# Patient Record
Sex: MALE | Race: WHITE | NOT HISPANIC OR LATINO | Employment: UNEMPLOYED | URBAN - METROPOLITAN AREA
[De-identification: names, ages, dates, MRNs, and addresses within clinical notes are randomized per-mention and may not be internally consistent; named-entity substitution may affect disease eponyms.]

---

## 2023-06-06 ENCOUNTER — TELEPHONE (OUTPATIENT)
Dept: INTERNAL MEDICINE CLINIC | Facility: OTHER | Age: 58
End: 2023-06-06

## 2023-08-18 ENCOUNTER — TELEPHONE (OUTPATIENT)
Dept: INTERNAL MEDICINE CLINIC | Facility: OTHER | Age: 58
End: 2023-08-18

## 2023-08-18 NOTE — TELEPHONE ENCOUNTER
Received call from patient requesting to schedule a new patient virtual appointment. Patient does not have any way to get in the office, he is currently bed rested. Can this appointment be made? It does look like in the past pt had scheduled a new pt appointment with Jonny Hyde but it was cancelled.

## 2023-08-22 NOTE — TELEPHONE ENCOUNTER
Wife called back and said that he is home bound that EMS had to bring him home and they live on the second floor. She stated they do not have any home care coming in.  I told her to call her insurance and see if they can get a  to help with arranging transportation to get him to appointments

## 2023-11-02 ENCOUNTER — HOSPITAL ENCOUNTER (INPATIENT)
Facility: HOSPITAL | Age: 58
LOS: 10 days | Discharge: HOSPICE | DRG: 283 | End: 2023-11-12
Attending: EMERGENCY MEDICINE | Admitting: INTERNAL MEDICINE
Payer: MEDICARE

## 2023-11-02 ENCOUNTER — APPOINTMENT (EMERGENCY)
Dept: RADIOLOGY | Facility: HOSPITAL | Age: 58
DRG: 283 | End: 2023-11-02
Payer: MEDICARE

## 2023-11-02 DIAGNOSIS — K59.00 CONSTIPATION, UNSPECIFIED CONSTIPATION TYPE: ICD-10-CM

## 2023-11-02 DIAGNOSIS — R18.8 ASCITES OF LIVER: Primary | ICD-10-CM

## 2023-11-02 DIAGNOSIS — R06.01 ORTHOPNEA: ICD-10-CM

## 2023-11-02 DIAGNOSIS — K72.90 DECOMPENSATED HEPATIC CIRRHOSIS (HCC): ICD-10-CM

## 2023-11-02 DIAGNOSIS — R14.0 ABDOMINAL DISTENTION: ICD-10-CM

## 2023-11-02 DIAGNOSIS — R09.02 HYPOXIA: ICD-10-CM

## 2023-11-02 DIAGNOSIS — K74.60 DECOMPENSATED HEPATIC CIRRHOSIS (HCC): ICD-10-CM

## 2023-11-02 PROBLEM — E66.9 CLASS 2 OBESITY IN ADULT: Status: ACTIVE | Noted: 2023-11-02

## 2023-11-02 PROBLEM — F32.A DEPRESSION: Status: ACTIVE | Noted: 2023-11-02

## 2023-11-02 PROBLEM — E66.812 CLASS 2 OBESITY IN ADULT: Status: ACTIVE | Noted: 2023-11-02

## 2023-11-02 PROBLEM — K72.10 END STAGE LIVER DISEASE (HCC): Status: ACTIVE | Noted: 2023-11-02

## 2023-11-02 LAB
ALBUMIN SERPL BCP-MCNC: 2.3 G/DL (ref 3.5–5)
ALP SERPL-CCNC: 94 U/L (ref 34–104)
ALT SERPL W P-5'-P-CCNC: 5 U/L (ref 7–52)
ANION GAP SERPL CALCULATED.3IONS-SCNC: 5 MMOL/L
APTT PPP: 45 SECONDS (ref 23–37)
AST SERPL W P-5'-P-CCNC: 25 U/L (ref 13–39)
BASOPHILS # BLD AUTO: 0.06 THOUSANDS/ÂΜL (ref 0–0.1)
BASOPHILS NFR BLD AUTO: 1 % (ref 0–1)
BILIRUB DIRECT SERPL-MCNC: 1.02 MG/DL (ref 0–0.2)
BILIRUB SERPL-MCNC: 2.37 MG/DL (ref 0.2–1)
BUN SERPL-MCNC: 9 MG/DL (ref 5–25)
CALCIUM ALBUM COR SERPL-MCNC: 9.6 MG/DL (ref 8.3–10.1)
CALCIUM SERPL-MCNC: 8.2 MG/DL (ref 8.4–10.2)
CHLORIDE SERPL-SCNC: 98 MMOL/L (ref 96–108)
CO2 SERPL-SCNC: 31 MMOL/L (ref 21–32)
CREAT SERPL-MCNC: 0.8 MG/DL (ref 0.6–1.3)
EOSINOPHIL # BLD AUTO: 0.19 THOUSAND/ÂΜL (ref 0–0.61)
EOSINOPHIL NFR BLD AUTO: 3 % (ref 0–6)
ERYTHROCYTE [DISTWIDTH] IN BLOOD BY AUTOMATED COUNT: 15.3 % (ref 11.6–15.1)
GFR SERPL CREATININE-BSD FRML MDRD: 99 ML/MIN/1.73SQ M
GLUCOSE SERPL-MCNC: 91 MG/DL (ref 65–140)
HCT VFR BLD AUTO: 32.2 % (ref 36.5–49.3)
HGB BLD-MCNC: 10.3 G/DL (ref 12–17)
IMM GRANULOCYTES # BLD AUTO: 0.01 THOUSAND/UL (ref 0–0.2)
IMM GRANULOCYTES NFR BLD AUTO: 0 % (ref 0–2)
INR PPP: 1.54 (ref 0.84–1.19)
LIPASE SERPL-CCNC: 12 U/L (ref 11–82)
LYMPHOCYTES # BLD AUTO: 0.9 THOUSANDS/ÂΜL (ref 0.6–4.47)
LYMPHOCYTES NFR BLD AUTO: 13 % (ref 14–44)
MCH RBC QN AUTO: 33.2 PG (ref 26.8–34.3)
MCHC RBC AUTO-ENTMCNC: 32 G/DL (ref 31.4–37.4)
MCV RBC AUTO: 104 FL (ref 82–98)
MONOCYTES # BLD AUTO: 0.98 THOUSAND/ÂΜL (ref 0.17–1.22)
MONOCYTES NFR BLD AUTO: 14 % (ref 4–12)
NEUTROPHILS # BLD AUTO: 4.78 THOUSANDS/ÂΜL (ref 1.85–7.62)
NEUTS SEG NFR BLD AUTO: 69 % (ref 43–75)
NRBC BLD AUTO-RTO: 0 /100 WBCS
PLATELET # BLD AUTO: 233 THOUSANDS/UL (ref 149–390)
PMV BLD AUTO: 8.6 FL (ref 8.9–12.7)
POTASSIUM SERPL-SCNC: 3.7 MMOL/L (ref 3.5–5.3)
PROT SERPL-MCNC: 7.4 G/DL (ref 6.4–8.4)
PROTHROMBIN TIME: 18.2 SECONDS (ref 11.6–14.5)
RBC # BLD AUTO: 3.1 MILLION/UL (ref 3.88–5.62)
SODIUM SERPL-SCNC: 134 MMOL/L (ref 135–147)
WBC # BLD AUTO: 6.92 THOUSAND/UL (ref 4.31–10.16)

## 2023-11-02 PROCEDURE — 85730 THROMBOPLASTIN TIME PARTIAL: CPT | Performed by: EMERGENCY MEDICINE

## 2023-11-02 PROCEDURE — 99222 1ST HOSP IP/OBS MODERATE 55: CPT | Performed by: INTERNAL MEDICINE

## 2023-11-02 PROCEDURE — 85025 COMPLETE CBC W/AUTO DIFF WBC: CPT | Performed by: EMERGENCY MEDICINE

## 2023-11-02 PROCEDURE — 85610 PROTHROMBIN TIME: CPT | Performed by: EMERGENCY MEDICINE

## 2023-11-02 PROCEDURE — 0W9G3ZZ DRAINAGE OF PERITONEAL CAVITY, PERCUTANEOUS APPROACH: ICD-10-PCS | Performed by: RADIOLOGY

## 2023-11-02 PROCEDURE — 82248 BILIRUBIN DIRECT: CPT | Performed by: EMERGENCY MEDICINE

## 2023-11-02 PROCEDURE — 76705 ECHO EXAM OF ABDOMEN: CPT

## 2023-11-02 PROCEDURE — 36415 COLL VENOUS BLD VENIPUNCTURE: CPT | Performed by: EMERGENCY MEDICINE

## 2023-11-02 PROCEDURE — 80053 COMPREHEN METABOLIC PANEL: CPT | Performed by: EMERGENCY MEDICINE

## 2023-11-02 PROCEDURE — 83690 ASSAY OF LIPASE: CPT | Performed by: EMERGENCY MEDICINE

## 2023-11-02 PROCEDURE — 99284 EMERGENCY DEPT VISIT MOD MDM: CPT

## 2023-11-02 RX ORDER — FOLIC ACID 1 MG/1
1 TABLET ORAL DAILY
Status: DISCONTINUED | OUTPATIENT
Start: 2023-11-02 | End: 2023-11-12 | Stop reason: HOSPADM

## 2023-11-02 RX ORDER — BISACODYL 10 MG
10 SUPPOSITORY, RECTAL RECTAL DAILY PRN
Status: DISCONTINUED | OUTPATIENT
Start: 2023-11-02 | End: 2023-11-12 | Stop reason: HOSPADM

## 2023-11-02 RX ORDER — LANOLIN ALCOHOL/MO/W.PET/CERES
100 CREAM (GRAM) TOPICAL DAILY
Status: DISCONTINUED | OUTPATIENT
Start: 2023-11-02 | End: 2023-11-12 | Stop reason: HOSPADM

## 2023-11-02 RX ORDER — LACTULOSE 20 G/30ML
10 SOLUTION ORAL 3 TIMES DAILY
Status: DISCONTINUED | OUTPATIENT
Start: 2023-11-02 | End: 2023-11-03

## 2023-11-02 RX ORDER — POLYETHYLENE GLYCOL 3350 17 G/17G
17 POWDER, FOR SOLUTION ORAL DAILY
Status: DISCONTINUED | OUTPATIENT
Start: 2023-11-02 | End: 2023-11-12 | Stop reason: HOSPADM

## 2023-11-02 RX ORDER — FUROSEMIDE 40 MG/1
40 TABLET ORAL DAILY
Status: DISCONTINUED | OUTPATIENT
Start: 2023-11-02 | End: 2023-11-02

## 2023-11-02 RX ORDER — ENOXAPARIN SODIUM 100 MG/ML
40 INJECTION SUBCUTANEOUS DAILY
Status: DISCONTINUED | OUTPATIENT
Start: 2023-11-02 | End: 2023-11-12 | Stop reason: HOSPADM

## 2023-11-02 RX ADMIN — LACTULOSE 10 G: 20 SOLUTION ORAL at 16:07

## 2023-11-02 RX ADMIN — THIAMINE HCL TAB 100 MG 100 MG: 100 TAB at 16:07

## 2023-11-02 RX ADMIN — FOLIC ACID 1 MG: 1 TABLET ORAL at 16:07

## 2023-11-02 RX ADMIN — LACTULOSE 10 G: 20 SOLUTION ORAL at 22:02

## 2023-11-02 NOTE — DISCHARGE INSTRUCTIONS
Abdominal Paracentesis     WHAT YOU NEED TO KNOW:   Abdominal paracentesis is a procedure to remove abnormal fluid buildup in your abdomen. Fluid builds up because of liver problems, such as swelling and scarring. Heart failure, kidney disease, a mass, or problems with your pancreas may also cause fluid buildup. DISCHARGE INSTRUCTIONS:     Follow up with your healthcare provider as directed: Write down your questions so you remember to ask them during your visits. Wound care: Remove dressing after 24 hours. Leave glue in place. Return to your normal activities    Contact Interventional Radiology at [phone] Oliver PATIENTS: Contact Interventional Radiology at [phone]) Randi Sarah PATIENTS: Contact Interventional Radiology at [phone]) if:  You have a fever and your wound is red and swollen. You have yellow, green, or bad-smelling discharge coming from your wound. You have pain or swelling in your abdomen. You have an upset stomach or you vomit. You have sudden, sharp pain in your abdomen. You urinate very little or not at all. You feel confused and more tired than usual.   Your arm or leg feels warm, tender, and painful. It may look swollen and red. You suddenly feel lightheaded and have trouble breathing.

## 2023-11-02 NOTE — H&P
INTERNAL MEDICINE RESIDENCY ADMISSION H&P     Name: Lida Mensah   Age & Sex: 66 y.o. male   MRN: [id number]  Unit/Bed#: -01   Encounter: [id number]  Primary Care Provider: No primary care provider on file. Code Status: Level 1 - Full Code  Admission Status: INPATIENT   Disposition: Patient requires Med/Surg    Admit to team: SOD Team C     ASSESSMENT/PLAN     Principal Problem:    Decompensated hepatic cirrhosis ([address])  Active Problems:    Constipation    Other ascites      * Decompensated hepatic cirrhosis (HCC)  Assessment & Plan  MELD 3.0: 18 at 11/2/2023 10:02 AM  MELD-Na: 17 at 11/2/2023 10:02 AM  Calculated from:  Serum Creatinine: 0.80 mg/dL (Using min of 1 mg/dL) at 11/2/2023  9:37 AM  Serum Sodium: 134 mmol/L at 11/2/2023  9:37 AM  Total Bilirubin: 2.37 mg/dL at 11/2/2023  9:37 AM  Serum Albumin: 2.3 g/dL at 11/2/2023  9:37 AM  INR(ratio): 1.54 at 11/2/2023 10:02 AM  Age at listing (hypothetical): 66 years  Sex: Male at 11/2/2023 10:02 AM     In the past, patients lab work up was significant for type 1 autoimmune hepatitis with elevated IgG, JOSE E, and anti-smooth muscle antibody. Patient has not had paracentesis since May 2023 and has not followed up with outpatient. Today, patient presented to the ED with severe abdominal distention, constipation, and abdominal pain. Patient states his abdominal swelling has become so severe, he is unable to get around. Additionally, he is unable to lay on his back without significant respiratory distress for CT imaging. On arrival in ED, patient was afebrile. Labs were significant for normal lipase, normal WBC, Hgb 10.3, Na 134, Ca 8.2, ALT 5, Alb 2.3, T. Bili 2.37, INR 1.54, PTT 45, direct bili 1.02. RUQ U/S showed ascites visible in all 4 quadrants.      Plan:  -Plan for large volume paracentesis with IR, NPO  -Will need post-procedure albumin infusion   -Ascites fluid analysis   -Recommend abdominal imaging with IV contrast after paracentesis to eval hepatic vasculature and rule out [address]  -Possible IR-guided liver biopsy after resolution of acute symptoms  -No indication for EGD at this time  -Check BMP after paracentesis  -Pending paracentesis with need re initiation of diuretics   -Thiamine, folic acid started  -Lactulose titrated to 3-4 BM, patient not encephalopathic at this time   -GI consulted, appreciate recommendations     Other ascites  Assessment & Plan  See plan for "decompensated hepatic cirrhosis"    Constipation  Assessment & Plan  Patient constipated in setting of severe ascites and compression most likely    Plan:  -Follow-up after paracentesis for resolution of constipation  -Miralax and Senna started        VTE Pharmacologic Prophylaxis: Enoxaparin  VTE Mechanical Prophylaxis: sequential compression device    CHIEF COMPLAINT     Chief Complaint   Patient presents with    Medical Problem     C/o of distended abd with potential hernia per pt due to feeling a lump in his belly, and constipation. Has not taken laxatives, and states this has been going on for years. HISTORY OF PRESENT ILLNESS     Mr. Deven Rondon is a 66 y.o. male with a past medical history of decompensated cirrhosis 2/2 autoimmune hepatitis who presents to B-ED on 11/2/23 complaining of abdominal pain, distention, and constipation. Patient was previously hospitalized at [facility] in April-May 2023 for elevated bilirubin, hyponatremia, coagulopathy, hypoxia, and hypercapnia. During hospitalization patient underwent multiple paracenteses and thoracenteses. There was multiple incidents where patient was refusing procedures and he was found to not have capacity to make medical decisions per psychiatry. On discharge, patient was recommended to see hepatology, and prescribed multiple medications upon discharge. Patient states he has not taken any of the prescribed medications and still takes no medications on a daily basis.  He did not have any outpatient follow-ups since due to concerns with transportation. In the past, patients lab work up was significant for type 1 autoimmune hepatitis with elevated IgG, JOSE E, and anti-smooth muscle antibody. Patient has not had paracentesis since May 2023 and has not followed up with outpatient. Today, patient presented to the ED with severe abdominal distention, constipation, and abdominal pain. Patient states his abdominal swelling has become so severe, he is unable to get around. Additionally, he is unable to lay on his back without significant respiratory distress for CT imaging. On arrival in ED, patient was afebrile. Labs were significant for normal lipase, normal WBC, Hgb 10.3, Na 134, Ca 8.2, ALT 5, Alb 2.3, T. Bili 2.37, INR 1.54, PTT 45, direct bili 1.02. RUQ U/S showed ascites visible in all 4 quadrants. Patient was seen and examined at bedside. He was laying on his right side due to inability to lay flat. Patient states that he was very frustrated with his previous hospitalization and would like to know why he has so much fluid in his abdomen. His largest concern at this point is constipation 2/2 fluid build up and was his main reason for presentation. He denies chest pain, SOB, leg swelling. Patient informed of plan for IR paracentesis. Admitted to [facility] for further evaluation and management. Full Code. REVIEW OF SYSTEMS     Review of Systems   Constitutional:  Positive for fatigue. Negative for activity change, appetite change and fever. HENT:  Negative for congestion. Respiratory:  Negative for chest tightness and shortness of breath. Cardiovascular:  Negative for chest pain, palpitations and leg swelling. Gastrointestinal:  Positive for abdominal distention and constipation. Negative for abdominal pain, diarrhea, nausea and vomiting. Endocrine: Negative for polyuria. Musculoskeletal:  Negative for arthralgias. Skin:  Negative for color change.    Neurological:  Negative for dizziness, syncope, weakness, light-headedness and headaches. All other systems reviewed and are negative. OBJECTIVE     Vitals:    23 1110 23 1200 23 1545 23 1546   BP:  156/91 97/66 97/66   BP Location:       Pulse:  96 92 98   Resp:  20     Temp:   98.1 °F (36.7 °C) 98.1 °F (36.7 °C)   TempSrc:       SpO2: 97% 100% 93% 98%      Temperature:   Temp (24hrs), Av.9 °F (36.6 °C), Min:97.6 °F (36.4 °C), Max:98.1 °F (36.7 °C)    Temperature: 98.1 °F (36.7 °C)  Intake & Output:  I/O       None          Weights: There is no height or weight on file to calculate BMI. Weight (last 2 days)       None            Physical Exam  Vitals and nursing note reviewed. Constitutional:       General: He is not in acute distress. Appearance: He is not ill-appearing or diaphoretic. HENT:      Head: Normocephalic and atraumatic. Nose: No congestion. Mouth/Throat:      Dentition: Abnormal dentition. Pharynx: Oropharynx is clear. Eyes:      Conjunctiva/sclera: Conjunctivae normal.   Cardiovascular:      Rate and Rhythm: Normal rate and regular rhythm. Pulses: Normal pulses. Heart sounds: Normal heart sounds. No murmur heard. Pulmonary:      Effort: Pulmonary effort is normal. No respiratory distress. Breath sounds: Normal breath sounds. No wheezing. Abdominal:      General: There is distension. Musculoskeletal:         General: No swelling. Normal range of motion. Cervical back: Normal range of motion. Right lower leg: No edema. Left lower leg: No edema. Skin:     General: Skin is warm. Capillary Refill: Capillary refill takes less than 2 seconds. Coloration: Skin is not jaundiced. Neurological:      General: No focal deficit present. Mental Status: He is alert and oriented to person, place, and time. Psychiatric:         Mood and Affect: Mood normal.         Behavior: Behavior normal.       PAST MEDICAL HISTORY   History reviewed.  No pertinent past medical history. PAST SURGICAL HISTORY   History reviewed. No pertinent surgical history. SOCIAL & FAMILY HISTORY     Social History     Substance and Sexual Activity   Alcohol Use Not Currently       Social History     Substance and Sexual Activity   Drug Use Never     Social History     Tobacco Use   Smoking Status Never    Passive exposure: Past   Smokeless Tobacco Never     History reviewed. No pertinent family history. LABORATORY DATA     Labs: I have personally reviewed pertinent reports. Results from last 7 days   Lab Units 11/02/23  0937   WBC Thousand/uL 6.92   HEMOGLOBIN g/dL 10.3*   HEMATOCRIT % 32.2*   PLATELETS Thousands/uL 233   NEUTROS PCT % 69   MONOS PCT % 14*   EOS PCT % 3      Results from last 7 days   Lab Units 11/02/23  0937   POTASSIUM mmol/L 3.7   CHLORIDE mmol/L 98   CO2 mmol/L 31   BUN mg/dL 9   CREATININE mg/dL 0.80   CALCIUM mg/dL 8.2*   ALK PHOS U/L 94   ALT U/L 5*   AST U/L 25              Results from last 7 days   Lab Units 11/02/23  1002   INR  1.54*   PTT seconds 45*             Micro:  No results found for: "BLOODCX", "[place]", "WOUNDCULT", "SPUTUMCULTUR"  IMAGING & DIAGNOSTIC TESTS     Imaging: I have personally reviewed pertinent reports. US abdomen limited    Result Date: 11/2/2023  Impression: Ascites visible in all 4 quadrants. Workstation performed: [id number]     EKG, Pathology, and Other Studies: I have personally reviewed pertinent reports.      ALLERGIES   No Known Allergies  MEDICATIONS PRIOR TO ARRIVAL     Prior to Admission medications    Not on File     MEDICATIONS ADMINISTERED IN LAST 24 HOURS     Medication Administration - last 24 hours from 11/01/2023 1611 to 11/02/2023 1611         Date/Time Order Dose Route Action Action by     11/02/2023 1604 EDT enoxaparin (LOVENOX) subcutaneous injection 40 mg 40 mg Subcutaneous Not Given Amalia Ayala RN     11/02/2023 1448 EDT polyethylene glycol (MIRALAX) packet 17 g 17 g Oral Not Given Amalia Ayala RN 11/02/2023 1519 EDT furosemide (LASIX) tablet 40 mg -- Oral Canceled Entry Bibiana Done, RN     11/02/2023 1607 EDT thiamine tablet 100 mg 100 mg Oral Given Mccarty Done, RN     74/77/7114 0995 EDT folic acid (FOLVITE) tablet 1 mg 1 mg Oral Given Mccarty Done, RN     11/02/2023 1607 EDT lactulose oral solution 10 g 10 g Oral Given Bibiana Done, RN          CURRENT MEDICATIONS     Current Facility-Administered Medications   Medication Dose Route Frequency Provider Last Rate    bisacodyl  10 mg Rectal Daily PRN Leodan Hutchison MD      enoxaparin  40 mg Subcutaneous Daily Leodan Hutchison MD      folic acid  1 mg Oral Daily Leodan Hutchison MD      lactulose  10 g Oral TID Leodan Hutchison MD      polyethylene glycol  17 g Oral Daily Leodan Hutchison MD      thiamine  100 mg Oral Daily Leodan Hutchison MD          bisacodyl, 10 mg, Daily PRN        Admission Time  I spent 1 hour admitting the patient. This involved direct patient contact where I performed a full history and physical, reviewing previous records, and reviewing laboratory and other diagnostic studies. Portions of the record may have been created with voice recognition software. Occasional wrong word or "sound a like" substitutions may have occurred due to the inherent limitations of voice recognition software.   Read the chart carefully and recognize, using context, where substitutions have occurred.    ==  Leodan Hutchison MD  [address] [facility]  Internal Medicine Residency PGY-II

## 2023-11-02 NOTE — ED PROCEDURE NOTE
PROCEDURE  CriticalCare Time    Date/Time: 11/2/2023 1:23 PM    Performed by: Ivan Carrington DO  Authorized by:  Ivan Carrington DO    Critical care provider statement:     Critical care time (minutes):  33    Critical care time was exclusive of:  Separately billable procedures and treating other patients and teaching time    Critical care was necessary to treat or prevent imminent or life-threatening deterioration of the following conditions:  Respiratory failure    Critical care was time spent personally by me on the following activities:  Blood draw for specimens, obtaining history from patient or surrogate, development of treatment plan with patient or surrogate, discussions with primary provider, evaluation of patient's response to treatment, examination of patient, review of old charts, re-evaluation of patient's condition, ordering and review of radiographic studies, ordering and review of laboratory studies and ordering and performing treatments and interventions    I assumed direction of critical care for this patient from another provider in my specialty: velia Carrington DO  11/02/23 1322

## 2023-11-02 NOTE — ASSESSMENT & PLAN NOTE
MELD 3.0: 17 at 11/4/2023  3:57 AM  MELD-Na: 17 at 11/4/2023  3:57 AM  Calculated from:  Serum Creatinine: 0.70 mg/dL (Using min of 1 mg/dL) at 11/4/2023  3:57 AM  Serum Sodium: 134 mmol/L at 11/4/2023  3:57 AM  Total Bilirubin: 2.27 mg/dL at 11/4/2023  3:57 AM  Serum Albumin: 2.7 g/dL at 11/4/2023  3:57 AM  INR(ratio): 1.54 at 11/2/2023 10:02 AM  Age at listing (hypothetical): 66 years  Sex: Male at 11/4/2023  3:57 AM         Patient currently presenting with decompensated cirrhosis with marked ascites and encephalopathy. 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 However, patient's mother also revealed a significant history of alcohol abuse. Likely dual etiology. Patient has not had paracentesis since May 2023 and has not followed up with outpatient. S/p paracentesis on 11/3 with removal of 19 L. Partially repleted with albumin, although patient refused full dose. SAAG<1.1, fluid without bacterial growth as of 11/6. S/p paracentesis on 11/6 with removal of 16.45L. Not repleted with albumin, but BP has been stable. Continue to monitor vitals with concern for infection. Patient continues to present with encephalopathy. Not mentating at baseline. Asterixis on exam. Reduced communication and responsiveness during examination. Patient has refused all meds since 11/5 and has not had a bowel movement in several days. Concern for worsening encephalopathy. May need to utilize lactulose enema if PO continues to be refused by patient. If patient transitions to home hospice consider ASEPT catheter to drain ascites. Palliative care consulted. Plan:  Disposition pending further discussion between family, primary team and palliative care; currently decided on home hospice, discussion regarding code status ongoing. Consider ASEPT catheter vs. repeated paracenteses once disposition finalized.   Attempt to give patient aldactone/lactulose, thiamine/folate supplements.    Consider lactulose enema  Monitor outputs  Sodium and fluid restricted diet, ensure protein supplements  6-8g/liter albumin replacement for paracentesis > 5L  Neuropsych eval obtained, patient lacks capacity at this time

## 2023-11-02 NOTE — ED PROVIDER NOTES
Emergency Department Note- Garth Porter 66 y.o. male MRN: [id number]    Unit/Bed#: ED 19 Encounter: [id number]        History of Present Illness     Patient hospitalized April 11 through May 17, 2023 at outside facility, per discharge summary the patient presented with respiratory distress, no known past medical history. Was diagnosed with end-stage liver disease, recurrent ascites causing respiratory failure, hypoxia, hypercapnia and metabolic encephalopathy. He underwent thoracentesis, repeat thoracentesis and paracenteses, ultimately refused additional paracenteses,. Patient was noted to be depressed, it was felt he would not benefit from placement of a Pleurx catheter because of concerns regarding noncompliance and empyema, recommended the patient have frequent paracenteses as an outpatient. He was discharged home, recommendations for outpatient follow-up. Patient says that he lives with his family, since discharge he has not followed up, did not trust the care he received at the outside facility or the network and has been living at home. He says that he will have episodes where his abdomen swells up, he has a bowel movement, feels better. Yesterday he noticed that he had increasing abdominal distention, felt like his abdomen was swelling up, had a hard time having a bowel movement. He took a 15 mL dose of milk of magnesia. He says right now feels short of breath, worse with laying flat and better with sitting up, feels like his abdomen distention is about the same as was from yesterday. He has no fever, no chills, no nausea or vomiting. He said he called an ambulance today because it was harder for him to get up and ambulate with his abdominal distention.   EMS indicates patient remained hemodynamically stable in route    REVIEW OF SYSTEMS    Positive for the above      Past medical history: Ascites, liver disease, depression  Social History   Social History     Substance and Sexual Activity Alcohol Use Not Currently     Social History     Substance and Sexual Activity   Drug Use Never     Social History     Tobacco Use   Smoking Status Never    Passive exposure: Past   Smokeless Tobacco Never     Family History: History reviewed. No pertinent family history. MEDICATIONS: Denies    ALLERGIES:  No Known Allergies    Vitals:    11/02/23 0935 11/02/23 1000 11/02/23 1100 11/02/23 1200   BP:  144/84 151/91 156/91   TempSrc: Oral      Pulse:  98 102 96   Resp:  20  20   Patient Position - Orthostatic VS:   Lying    Temp: 97.6 °F (36.4 °C)          PHYSICAL EXAM    General:  Patient is well-appearing  Head:  Atraumatic  Eyes:  Conjunctiva pink  ENT:  Mucous membranes are moist  Neck:  Supple  Cardiac:  S1-S2, without murmurs  Lungs:  Clear to auscultation bilaterally  Abdomen: Abdomen is distended and tympanitic, nontender, slight shifting dullness. Rectal examination chaperoned by nurse shows no fecal impaction, nontender  Extremities:  Normal range of motion, diffuse lower extremity pitting pedal edema  Neurologic:  Awake, fluent speech, normal comprehension, AAOx3, strength is 2 out of 5 in his bilateral legs, 5 out of 5 in the bilateral arms, normal sensation throughout the whole body.   Skin:  Pink warm and dry  Psychiatric:  Alert, pleasant, cooperative      Labs Reviewed   CBC AND DIFFERENTIAL - Abnormal       Result Value Ref Range Status    WBC 6.92  4.31 - 10.16 Thousand/uL Final    RBC 3.10 (*) 3.88 - 5.62 Million/uL Final    Hemoglobin 10.3 (*) 12.0 - 17.0 g/dL Final    Hematocrit 32.2 (*) 36.5 - 49.3 % Final     (*) 82 - 98 fL Final    MCH 33.2  26.8 - 34.3 pg Final    MCHC 32.0  31.4 - 37.4 g/dL Final    RDW 15.3 (*) 11.6 - 15.1 % Final    MPV 8.6 (*) 8.9 - 12.7 fL Final    Platelets 832  986 - 390 Thousands/uL Final    nRBC 0  /100 WBCs Final    Neutrophils Relative 69  43 - 75 % Final    Immat GRANS % 0  0 - 2 % Final    Lymphocytes Relative 13 (*) 14 - 44 % Final    Monocytes Relative 14 (*) 4 - 12 % Final    Eosinophils Relative 3  0 - 6 % Final    Basophils Relative 1  0 - 1 % Final    Neutrophils Absolute 4.78  1.85 - 7.62 Thousands/µL Final    Immature Grans Absolute 0.01  0.00 - 0.20 Thousand/uL Final    Lymphocytes Absolute 0.90  0.60 - 4.47 Thousands/µL Final    Monocytes Absolute 0.98  0.17 - 1.22 Thousand/µL Final    Eosinophils Absolute 0.19  0.00 - 0.61 Thousand/µL Final    Basophils Absolute 0.06  0.00 - 0.10 Thousands/µL Final   COMPREHENSIVE METABOLIC PANEL - Abnormal    Sodium 134 (*) 135 - 147 mmol/L Final    Potassium 3.7  3.5 - 5.3 mmol/L Final    Chloride 98  96 - 108 mmol/L Final    CO2 31  21 - 32 mmol/L Final    ANION GAP 5  mmol/L Final    BUN 9  5 - 25 mg/dL Final    Creatinine 0.80  0.60 - 1.30 mg/dL Final    Comment: Standardized to IDMS reference method    Glucose 91  65 - 140 mg/dL Final    Comment: If the patient is fasting, the ADA then defines impaired fasting glucose as > 100 mg/dL and diabetes as > or equal to 123 mg/dL. Calcium 8.2 (*) 8.4 - 10.2 mg/dL Final    Corrected Calcium 9.6  8.3 - 10.1 mg/dL Final    AST 25  13 - 39 U/L Final    ALT 5 (*) 7 - 52 U/L Final    Comment: Specimen collection should occur prior to Sulfasalazine administration due to the potential for falsely depressed results. Alkaline Phosphatase 94  34 - 104 U/L Final    Total Protein 7.4  6.4 - 8.4 g/dL Final    Albumin 2.3 (*) 3.5 - 5.0 g/dL Final    Total Bilirubin 2.37 (*) 0.20 - 1.00 mg/dL Final    Comment: Use of this assay is not recommended for patients undergoing treatment with eltrombopag due to the potential for falsely elevated results. N-acetyl-p-benzoquinone imine (metabolite of Acetaminophen) will generate erroneously low results in samples for patients that have taken an overdose of Acetaminophen.     eGFR 99  ml/min/1.73sq m Final    Narrative:     [place] guidelines for Chronic Kidney Disease (CKD):     Stage 1 with normal or high GFR (GFR > 90 mL/min/1.73 square meters)    Stage 2 Mild CKD (GFR = 60-89 mL/min/1.73 square meters)    Stage 3A Moderate CKD (GFR = 45-59 mL/min/1.73 square meters)    Stage 3B Moderate CKD (GFR = 30-44 mL/min/1.73 square meters)    Stage 4 Severe CKD (GFR = 15-29 mL/min/1.73 square meters)    Stage 5 End Stage CKD (GFR <15 mL/min/1.73 square meters)  Note: GFR calculation is accurate only with a steady state creatinine   APTT - Abnormal    PTT 45 (*) 23 - 37 seconds Final    Comment: Therapeutic Heparin Range =  60-90 seconds   PROTIME-INR - Abnormal    Protime 18.2 (*) 11.6 - 14.5 seconds Final    INR 1.54 (*) 0.84 - 1.19 Final   BILIRUBIN, DIRECT - Abnormal    Bilirubin, Direct 1.02 (*) 0.00 - 0.20 mg/dL Final   LIPASE - Normal    Lipase 12  11 - 82 u/L Final       Medications - No data to display    US abdomen limited   Final Result      Ascites visible in all 4 quadrants. Workstation performed: [id number]             ED Course as of 11/02/23 1321   Thu Nov 02, 2023   1100 Notified by CT and nursing staff that the patient had been transferred to the CT table, he became significant more dyspneic, unable to tolerate laying flat. He had a brief episode of being minimally responsive but was never apneic or pulseless. I reassessed the patient, sat him up, he was feeling better sitting up, noted to be hypoxic, 88% on room air, placed on 2 L, 97% O2 saturation. Patient indicated that he felt that he moved slowly and allowed his body time to adjust that he was able to tolerate a supine position for imaging better. 1124 Per hospital discharge summary, patient is known to have elevated bilirubin   1129 Patient slowly placed into a position where he was flat on his back, head slightly elevated. No significant respiratory distress noted       Assessment/Plan     ED Medical Decision Making:    Patient presents with significant mount of ascites, no fever, no abdominal tenderness, do not believe he has SBP.   Given the large amount of abdominal distention, I suspect he will require a large-volume paracentesis which would be safer to be performed via interventional radiology. While he does have some slight hypoxia, he has no sign of significant instability that would require an immediate paracentesis in the ED. He has no evidence of life-threatening metabolic abnormality. The patient was evaluated for sepsis in the emergency department. After that assessment, at the time of admission, no sepsis, severe sepsis, or septic shock was found. MEDICAL DECISION MAKING CODING      Patient presents with acute new problem with:  Threat to life or bodily function      Chronic conditions affecting care: As per HPI    COLLECTION AND INTERPRETATION OF DATA  Additional history obtained from: EMS  I reviewed prior external notes, including hospital discharge summary as noted above      I ordered each unique test  Tests reviewed personally by me:  ECG: See my ED course  Labs: See above  Imaging: I independently reviewed the abdomen ultrasound and found ascites. Discussion with other providers: Admitting medicine physician    RISK    Treatment:  Patient admitted    Social Determinants of Health:  No primary care physician        Critical care time: I consider this patient to be critically ill given his hypoxia requiring treat with oxygen. 33 minutes critical care time. Please see separate procedure note for details.         Time reflects when diagnosis was documented in both MDM as applicable and the Disposition within this note       Time User Action Codes Description Comment    11/2/2023 11:28 AM Nikki Borjasan Add [R18.8] Ascites of liver     11/2/2023 11:28 AM Nikki Borjasan Add [R06.00] Dyspnea     11/2/2023 11:28 AM Mariea Dowse [R06.00] Dyspnea     11/2/2023 11:28 AM Nikki Borjasan Add [R06.01] Orthopnea     11/2/2023 11:29 AM Nikki Glenna Add [R09.02] Hypoxia     11/2/2023 11:29 AM Sharlyalban Glenna Add [R14.0] Abdominal distention           ED Disposition       ED Disposition   Admit    Condition   Stable    Date/Time   Thu Nov 2, 2023 12:58 PM    Comment   Case was discussed with Dr. Dior Ryan and the patient's admission status was agreed to be Admission Status: inpatient status to the service of Dr. Alexander Figueroa .                Follow-up Information    None         New Prescriptions    No medications on file            Alexandria Funk DO  11/02/23 0514

## 2023-11-02 NOTE — ED NOTES
Pt placed on a NRB after his episode of profound hypoxia(68%). His SPO2 recovered (100%)within minutes and the NRB was removed.       Donna Denny RN  11/02/23 0659

## 2023-11-02 NOTE — ASSESSMENT & PLAN NOTE
Patient endorsing constipation on admission, likely in setting of profound abdominal distention and ascites. He continues to endorse ongoing constipation even following paracentesis. Patient previously on lactulose. Of note, patient now with asterixis on exam this morning. Patient refusing lactulose since 11/5. Consider lactulose enema. Patient had 1 BM 11/8. Plan:  Palliative care consulted, future decision making by family. Planned for home hospice with discharge 11/12/23 at 10 AM.   Lactulose therapy, can consider lactulose retention enema as well; has been refusing.   Titrate to 3-4 bowel movements daily  Miralax/Dulcolax otherwise

## 2023-11-02 NOTE — CONSULTS
Consult Service: Gastroenterology      PATIENT INFORMATION      Nora Wright 66 y.o. male MRN: [id number]  Unit/Bed#: ED 23 Encounter: [id number]  PCP: No primary care provider on file. Date of Admission:  11/2/2023  Date of Consultation: 11/02/23  Requesting Physician: Brianna Vasquez, DO       ASSESSMENTS & PLAN   Nora Wright is a 66 y.o. 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      Decompensated Cirrhosis  MELD 3.0: 18 at 11/2/2023 10:02 AM  MELD-Na: 17 at 11/2/2023 10:02 AM  Calculated from:  Serum Creatinine: 0.80 mg/dL (Using min of 1 mg/dL) at 11/2/2023  9:37 AM  Serum Sodium: 134 mmol/L at 11/2/2023  9:37 AM  Total Bilirubin: 2.37 mg/dL at 11/2/2023  9:37 AM  Serum Albumin: 2.3 g/dL at 11/2/2023  9:37 AM  INR(ratio): 1.54 at 11/2/2023 10:02 AM  Age at listing (hypothetical): 66 years  Sex: Male at 11/2/2023 10:02 AM  Etiology  Based on prior workup with negative phosphatidylethanol, positive JOSE E, elevated IgG, positive anti smooth muscle antibody most likely 2/2 autoimmune hepatitis  However has never had a liver biopsy as far as we are able to tell per record review  Pending resolution of acute sx, would consider IR guided liver biopsy  EV  Has never had EGD per pt  On chart review may have had EGD 4/21/23 by Dr. Anders Lundborg at [facility], however unable to view or retrieve report, discharge summary does not report any EGD being performed   Trend CBC   No indication at this time for inpatient EGD but will discuss with patient and family pending treatment of large volume ascites  HE  None  No indication to check or trend ammonia levels, pt is not encephalopathic and has no asterixis  Ascites  Was requiring frequent paracenteses and thoracenteses at [facility] 4/2023-5/2023  Was recommended for weekly paracenteses as an outpt  Has not had a paracentesis since May 2023 per pt  IR consult for paracentesis Will most likely require post paracentesis albumin infusion  Check BMP after paracentesis   Please send cell count, differential, albumin, total protein, cytology with para studies   Pending para, will likely need reinitiation of diuretics    Constipation  Most likely in setting of severe ascites and resulting compression  Management of ascites as above, if still experiencing constipation will start a bowel regimen     REASON FOR CONSULTATION      Decompensated cirrhosis, abdominal pain, constipation    HISTORY OF PRESENT ILLNESS      Pat Aquino is a 66 y.o. male with PMH of decompensated cirrhosis (db ascites) 2/2 AIH presenting with abdominal pain, distention, constipation, with GI consulted for these sx. Per [facility] records, appears to have had no significant medical history until presentation to [facility] 4/2023 for elevated bilirubin, hyponatremia, coagulopathy, hypoxia, hypercapnia. Underwent multiple thoracenteses and paracenteses, found to not have capacity to make medical decisions by psychiatry team there in s/o refusing these procedures multiple times as well. Discharged w hepatology follow up and recommendation for weekly paracenteses. Did not make outpt follow up appointments due to transportation concerns per some of the telephone documentation. Work up for chronic liver disease in the past indicates Type 1 autoimmune hepatitis is the most likely etiology of pt's sx with elevated IgG, JOSE E, and anti-smooth muscle antibody. Pt states his last paracentesis was in the hospital in May. Has not followed up for outpt procedures, office visits, and does not take any medications. Presenting with severe abdominal distention, constipation, abdominal pain. REVIEW OF SYSTEMS     A thorough 12-point review of systems has been conducted. Pertinent positives and negatives are mentioned in the history of present illness. PAST MEDICAL & SURGICAL HISTORY      History reviewed.  No pertinent past medical history. History reviewed. No pertinent surgical history.       MEDICATIONS & ALLERGIES       Medications:   Prior to Admission medications    Not on File       Allergies: No Known Allergies      SOCIAL HISTORY      Marital Status: Single    Substance Use History:   Social History     Substance and Sexual Activity   Alcohol Use Not Currently     Social History     Tobacco Use   Smoking Status Never    Passive exposure: Past   Smokeless Tobacco Never     Social History     Substance and Sexual Activity   Drug Use Never         FAMILY HISTORY      Non-Contributory      PHYSICAL EXAM     Vitals:   Blood Pressure: 156/91 (11/02/23 1200)  Pulse: 96 (11/02/23 1200)  Temperature: 97.6 °F (36.4 °C) (11/02/23 0935)  Temp Source: Oral (11/02/23 0935)  Respirations: 20 (11/02/23 1200)  SpO2: 100 % (11/02/23 1200)    Physical Exam:   GENERAL: NAD  HEENT:  NC/AT, No Scleral Icterus  CARDIAC:  Regular Rate  PULMONARY:  Non-Labored Breathing, No Respiratory Distress  ABDOMEN:  severely distended, TTP  EXTREMITIES:  No Edema, Cyanosis, or Clubbing  NEUROLOGIC:  alert, oriented, no asterixis  SKIN:  No Rashes or Erythema    ADDITIONAL DATA     Lab Results:       Lab Units 11/02/23  0937   SODIUM mmol/L 134*   POTASSIUM mmol/L 3.7   CHLORIDE mmol/L 98   CO2 mmol/L 31   BUN mg/dL 9   CREATININE mg/dL 0.80   GLUCOSE RANDOM mg/dL 91   CALCIUM mg/dL 8.2*            Lab Units 11/02/23  0937   TOTAL PROTEIN g/dL 7.4   ALBUMIN g/dL 2.3*   TOTAL BILIRUBIN mg/dL 2.37*   BILIRUBIN DIRECT mg/dL 1.02*   AST U/L 25   ALT U/L 5*   ALK PHOS U/L 94           Lab Units 11/02/23  0937   WBC Thousand/uL 6.92   HEMOGLOBIN g/dL 10.3*   HEMATOCRIT % 32.2*   PLATELETS Thousands/uL 233   MCV fL 104*       No results found for: "IRON", "TIBC", "FERRITIN"    Lab Results   Component Value Date    INR 1.54 (H) 11/02/2023    PROTIME 18.2 (H) 11/02/2023         Microbiology Results:        Imaging:  Procedure: US abdomen limited    Result Date: 11/2/2023  Narrative: RIGHT UPPER QUADRANT ULTRASOUND INDICATION:    abd distention, eval for ascities. COMPARISON:  None. TECHNIQUE:   Real-time Limited ultrasound of the abdomen was performed with a curvilinear transducer with both volumetric sweeps and still imaging techniques. FINDINGS: ASCITES:   Present in all 4 quadrants. Impression: Ascites visible in all 4 quadrants. Workstation performed: [id number]     Narrative/Impressions - 3 day look back     EKG, Pathology, and Other Studies Reviewed on Admission:   EKG: Reviewed    Counseling / Coordination of Care Time: 30 total mins spent n consult. Greater than 50% of total time spent on patient counseling and coordination of care. ............................................................................................................................................... [place] M.D.  PGY-5 Gastroenterology Fellow  [facility]  Available on [email or web address]  Recommendations not final until attending attestation

## 2023-11-02 NOTE — ED NOTES
CT called for assist for pt with "unresponsive" period, pt responsive upon staff arrival, lips cyanotic, pt removed from CT department returned to room 19, placed on right side, on monitor, O2 applied briefly, pt requesting to remove O2, sats improving to 100% with O2, pt on monitor, Dr. Ernst Marquez at bedside, pt unable to lie on back due to large abdominal girth     Kamille Mendoza RN  11/02/23 0357

## 2023-11-03 ENCOUNTER — APPOINTMENT (INPATIENT)
Dept: RADIOLOGY | Facility: HOSPITAL | Age: 58
DRG: 283 | End: 2023-11-03
Payer: MEDICARE

## 2023-11-03 PROBLEM — D64.9 ANEMIA: Status: ACTIVE | Noted: 2023-11-03

## 2023-11-03 LAB
ALBUMIN FLD-MCNC: <1.5 G/DL
ALBUMIN SERPL BCP-MCNC: 1.9 G/DL (ref 3.5–5)
ALP SERPL-CCNC: 75 U/L (ref 34–104)
ALT SERPL W P-5'-P-CCNC: <3 U/L (ref 7–52)
ANION GAP SERPL CALCULATED.3IONS-SCNC: 4 MMOL/L
ANION GAP SERPL CALCULATED.3IONS-SCNC: 7 MMOL/L
APPEARANCE FLD: ABNORMAL
AST SERPL W P-5'-P-CCNC: 25 U/L (ref 13–39)
BASOPHILS # BLD AUTO: 0.05 THOUSANDS/ÂΜL (ref 0–0.1)
BASOPHILS NFR BLD AUTO: 1 % (ref 0–1)
BASOPHILS NFR FLD MANUAL: 1 %
BILIRUB SERPL-MCNC: 2.07 MG/DL (ref 0.2–1)
BUN SERPL-MCNC: 10 MG/DL (ref 5–25)
BUN SERPL-MCNC: 9 MG/DL (ref 5–25)
CALCIUM ALBUM COR SERPL-MCNC: 9.6 MG/DL (ref 8.3–10.1)
CALCIUM SERPL-MCNC: 7.9 MG/DL (ref 8.4–10.2)
CALCIUM SERPL-MCNC: 8.1 MG/DL (ref 8.4–10.2)
CHLORIDE SERPL-SCNC: 99 MMOL/L (ref 96–108)
CHLORIDE SERPL-SCNC: 99 MMOL/L (ref 96–108)
CO2 SERPL-SCNC: 28 MMOL/L (ref 21–32)
CO2 SERPL-SCNC: 31 MMOL/L (ref 21–32)
COLOR FLD: ABNORMAL
CREAT SERPL-MCNC: 0.64 MG/DL (ref 0.6–1.3)
CREAT SERPL-MCNC: 0.75 MG/DL (ref 0.6–1.3)
EOSINOPHIL # BLD AUTO: 0.15 THOUSAND/ÂΜL (ref 0–0.61)
EOSINOPHIL NFR BLD AUTO: 3 % (ref 0–6)
ERYTHROCYTE [DISTWIDTH] IN BLOOD BY AUTOMATED COUNT: 15.4 % (ref 11.6–15.1)
GFR SERPL CREATININE-BSD FRML MDRD: 102 ML/MIN/1.73SQ M
GFR SERPL CREATININE-BSD FRML MDRD: 109 ML/MIN/1.73SQ M
GLUCOSE FLD-MCNC: 82 MG/DL
GLUCOSE SERPL-MCNC: 71 MG/DL (ref 65–140)
GLUCOSE SERPL-MCNC: 94 MG/DL (ref 65–140)
HCT VFR BLD AUTO: 27.7 % (ref 36.5–49.3)
HGB BLD-MCNC: 8.9 G/DL (ref 12–17)
IMM GRANULOCYTES # BLD AUTO: 0.01 THOUSAND/UL (ref 0–0.2)
IMM GRANULOCYTES NFR BLD AUTO: 0 % (ref 0–2)
LDH FLD L TO P-CCNC: 41 U/L
LYMPHOCYTES # BLD AUTO: 0.93 THOUSANDS/ÂΜL (ref 0.6–4.47)
LYMPHOCYTES NFR BLD AUTO: 17 % (ref 14–44)
LYMPHOCYTES NFR BLD AUTO: 44 %
MAGNESIUM SERPL-MCNC: 1.7 MG/DL (ref 1.9–2.7)
MCH RBC QN AUTO: 33.6 PG (ref 26.8–34.3)
MCHC RBC AUTO-ENTMCNC: 32.1 G/DL (ref 31.4–37.4)
MCV RBC AUTO: 105 FL (ref 82–98)
MONOCYTES # BLD AUTO: 0.94 THOUSAND/ÂΜL (ref 0.17–1.22)
MONOCYTES NFR BLD AUTO: 17 % (ref 4–12)
MONOCYTES NFR BLD AUTO: 54 %
NEUTROPHILS # BLD AUTO: 3.4 THOUSANDS/ÂΜL (ref 1.85–7.62)
NEUTS SEG NFR BLD AUTO: 1 %
NEUTS SEG NFR BLD AUTO: 62 % (ref 43–75)
NRBC BLD AUTO-RTO: 0 /100 WBCS
PHOSPHATE SERPL-MCNC: 3.4 MG/DL (ref 2.7–4.5)
PLATELET # BLD AUTO: 166 THOUSANDS/UL (ref 149–390)
PMV BLD AUTO: 8.6 FL (ref 8.9–12.7)
POTASSIUM SERPL-SCNC: 3.7 MMOL/L (ref 3.5–5.3)
POTASSIUM SERPL-SCNC: 3.8 MMOL/L (ref 3.5–5.3)
PROT FLD-MCNC: <3 G/DL
PROT SERPL-MCNC: 6.5 G/DL (ref 6.4–8.4)
RBC # BLD AUTO: 2.65 MILLION/UL (ref 3.88–5.62)
SITE: ABNORMAL
SODIUM SERPL-SCNC: 134 MMOL/L (ref 135–147)
SODIUM SERPL-SCNC: 134 MMOL/L (ref 135–147)
TOTAL CELLS COUNTED SPEC: 100
WBC # BLD AUTO: 5.48 THOUSAND/UL (ref 4.31–10.16)
WBC # FLD MANUAL: 23 /UL

## 2023-11-03 PROCEDURE — 80053 COMPREHEN METABOLIC PANEL: CPT

## 2023-11-03 PROCEDURE — 83615 LACTATE (LD) (LDH) ENZYME: CPT

## 2023-11-03 PROCEDURE — 85025 COMPLETE CBC W/AUTO DIFF WBC: CPT

## 2023-11-03 PROCEDURE — 84100 ASSAY OF PHOSPHORUS: CPT

## 2023-11-03 PROCEDURE — 88112 CYTOPATH CELL ENHANCE TECH: CPT | Performed by: PATHOLOGY

## 2023-11-03 PROCEDURE — NC001 PR NO CHARGE: Performed by: INTERNAL MEDICINE

## 2023-11-03 PROCEDURE — 89051 BODY FLUID CELL COUNT: CPT

## 2023-11-03 PROCEDURE — 87070 CULTURE OTHR SPECIMN AEROBIC: CPT

## 2023-11-03 PROCEDURE — 87205 SMEAR GRAM STAIN: CPT

## 2023-11-03 PROCEDURE — 88305 TISSUE EXAM BY PATHOLOGIST: CPT | Performed by: PATHOLOGY

## 2023-11-03 PROCEDURE — 49083 ABD PARACENTESIS W/IMAGING: CPT | Performed by: PHYSICIAN ASSISTANT

## 2023-11-03 PROCEDURE — 80048 BASIC METABOLIC PNL TOTAL CA: CPT

## 2023-11-03 PROCEDURE — 82945 GLUCOSE OTHER FLUID: CPT

## 2023-11-03 PROCEDURE — 83735 ASSAY OF MAGNESIUM: CPT

## 2023-11-03 PROCEDURE — 84157 ASSAY OF PROTEIN OTHER: CPT

## 2023-11-03 PROCEDURE — 82042 OTHER SOURCE ALBUMIN QUAN EA: CPT

## 2023-11-03 PROCEDURE — 99223 1ST HOSP IP/OBS HIGH 75: CPT | Performed by: INTERNAL MEDICINE

## 2023-11-03 PROCEDURE — 49083 ABD PARACENTESIS W/IMAGING: CPT

## 2023-11-03 RX ORDER — MAGNESIUM SULFATE HEPTAHYDRATE 40 MG/ML
2 INJECTION, SOLUTION INTRAVENOUS ONCE
Status: COMPLETED | OUTPATIENT
Start: 2023-11-03 | End: 2023-11-03

## 2023-11-03 RX ORDER — ALBUMIN (HUMAN) 12.5 G/50ML
25 SOLUTION INTRAVENOUS EVERY 6 HOURS
Status: DISCONTINUED | OUTPATIENT
Start: 2023-11-03 | End: 2023-11-03

## 2023-11-03 RX ORDER — LIDOCAINE HYDROCHLORIDE 10 MG/ML
INJECTION, SOLUTION EPIDURAL; INFILTRATION; INTRACAUDAL; PERINEURAL AS NEEDED
Status: COMPLETED | OUTPATIENT
Start: 2023-11-03 | End: 2023-11-03

## 2023-11-03 RX ORDER — KETOROLAC TROMETHAMINE 30 MG/ML
15 INJECTION, SOLUTION INTRAMUSCULAR; INTRAVENOUS ONCE
Status: COMPLETED | OUTPATIENT
Start: 2023-11-03 | End: 2023-11-03

## 2023-11-03 RX ORDER — ALBUMIN (HUMAN) 12.5 G/50ML
50 SOLUTION INTRAVENOUS ONCE
Status: COMPLETED | OUTPATIENT
Start: 2023-11-04 | End: 2023-11-04

## 2023-11-03 RX ORDER — LANOLIN ALCOHOL/MO/W.PET/CERES
3 CREAM (GRAM) TOPICAL
Status: DISCONTINUED | OUTPATIENT
Start: 2023-11-03 | End: 2023-11-12 | Stop reason: HOSPADM

## 2023-11-03 RX ORDER — ALBUMIN (HUMAN) 12.5 G/50ML
100 SOLUTION INTRAVENOUS ONCE
Status: COMPLETED | OUTPATIENT
Start: 2023-11-03 | End: 2023-11-03

## 2023-11-03 RX ADMIN — MAGNESIUM SULFATE HEPTAHYDRATE 2 G: 40 INJECTION, SOLUTION INTRAVENOUS at 11:24

## 2023-11-03 RX ADMIN — FOLIC ACID 1 MG: 1 TABLET ORAL at 09:25

## 2023-11-03 RX ADMIN — ALBUMIN (HUMAN) 100 G: 0.25 INJECTION, SOLUTION INTRAVENOUS at 17:20

## 2023-11-03 RX ADMIN — KETOROLAC TROMETHAMINE 15 MG: 30 INJECTION, SOLUTION INTRAMUSCULAR; INTRAVENOUS at 03:55

## 2023-11-03 RX ADMIN — THIAMINE HCL TAB 100 MG 100 MG: 100 TAB at 09:25

## 2023-11-03 RX ADMIN — MELATONIN 3 MG: at 01:52

## 2023-11-03 RX ADMIN — MELATONIN 3 MG: at 21:52

## 2023-11-03 RX ADMIN — LIDOCAINE HYDROCHLORIDE 10 ML: 10 INJECTION, SOLUTION EPIDURAL; INFILTRATION; INTRACAUDAL; PERINEURAL at 14:09

## 2023-11-03 NOTE — PROGRESS NOTES
Patient returned from IR procedure. Bandage on LLQ is clean dry and intact. Upon return to unit, patient became hypotensive, however not symptomatic. MACARENA ROMAN made aware. Per MD, gave albumin. RN will continue to monitor.

## 2023-11-03 NOTE — PROGRESS NOTES
INTERNAL MEDICINE RESIDENCY PROGRESS NOTE     Name: Mahendra Panda   Age & Sex: 66 y.o. male   MRN: [id number]  Unit/Bed#: -01   Encounter: [id number]  Team: SOD Team C     PATIENT INFORMATION     Name: Mahendra Panda   Age & Sex: 66 y.o. male   MRN: [id number]  Hospital Stay Days: 1    ASSESSMENT/PLAN     Principal Problem:    Decompensated hepatic cirrhosis ([address])  Active Problems:    Other ascites    Constipation    Anemia      * Decompensated hepatic cirrhosis ([address])  Assessment & Plan  MELD 3.0: 18 at 11/3/2023  5:11 AM  MELD-Na: 17 at 11/3/2023  5:11 AM  Calculated from:  Serum Creatinine: 0.64 mg/dL (Using min of 1 mg/dL) at 11/3/2023  5:11 AM  Serum Sodium: 134 mmol/L at 11/3/2023  5:11 AM  Total Bilirubin: 2.07 mg/dL at 11/3/2023  5:11 AM  Serum Albumin: 1.9 g/dL at 11/3/2023  5:11 AM  INR(ratio): 1.54 at 11/2/2023 10:02 AM  Age at listing (hypothetical): 66 years  Sex: Male at 11/3/2023  5:11 AM     In the past, patient's lab work up was suggestive of type 1 autoimmune hepatitis with elevated IgG, JOSE E, and anti-smooth muscle antibody. Patient has not had paracentesis since May 2023 and has not followed up with outpatient. Patient presently admitted with cirrhosis decompensated by marked ascites without evidence of encephalopathy. As of today, patient remains with significant ascites on exam.    Plan:  -Plan for large volume paracentesis with IR, currently NPO  -Will need post-procedure albumin infusion; amount to be determined pending paracentesis volume  -Ascites fluid analysis to be collected    -Will try to coordinate liver biopsy to assist with diagnosis once paracentesis completed  -Thiamine, folic acid started  -GI consulted, appreciate recommendations     Other ascites  Assessment & Plan  See plan for "decompensated hepatic cirrhosis"    Anemia  Assessment & Plan  Patient found to be anemic on presentation - hemoglobin today at 8.9.  Suspect that this is at least partially dilutional given patient's significant volume retention, although other causes not excluded. Previous iron panels notable for low normal iron, markedly low transferrin, elevated ferritin. Plan  Continue to monitor, especially following paracentesis  Will consider repeat iron studies in the future    Constipation  Assessment & Plan  Patient endorsing constipation for the last two days - likely in the setting of significant abdominal distention due to ascites. Overall, would expect symptoms to improve following paracentesis    Plan:  -Follow-up after paracentesis for resolution of constipation; consider further evaluation and intervention as indicated  -Miralax/Dulcolax ordered for time being          Disposition: Remain admitted pending IR paracentesis and medical optimization, including ongoing GI evaluation     SUBJECTIVE     Patient seen and examined. Overnight, patient noted to be complaining of abdominal pain, for which he received Toradol. This morning, patient reports severe abdominal distention, constipation, and inability to lay flat due to SOB and wet cough. Patient reports he has not had a bowel movement in 2 days. Patient denies SOB at rest, blood in stool, blood in urine, hemoptysis, LE extremity pain, change in skin tone/rashes, chills, nausea, or vomiting. Patient reports that he has not improved physically since his last hospitalization at [facility] in April/May, and he does not take any medications at home on a daily basis. He expresses belief that drinking dandelion flower tea helps him get rid of a substantial amount of fluid, and does not understand why his abdominal distention would progress to this degree. Patient denies any past medical history prior to the development of current cirrhosis, denies tobacco use other than occasional cigars many years ago, and denies alcohol use. 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  Patient has never had a colonoscopy or EGD.     When asked, patient reports that he denied Lovenox injection yesterday because his father, who passed away, was on Coumadin, and his sister has attributed the death to the use of blood thinners. OBJECTIVE     Vitals:    23 2313 23 0340 23 0600 23 0721   BP: 98/66   100/64   BP Location:       Pulse: 91   86   Resp:       Temp: 98.5 °F (36.9 °C)   98.2 °F (36.8 °C)   TempSrc:       SpO2: 95%   94%   Weight:  (!) 148 kg (325 lb 13.4 oz) (!) 148 kg (325 lb 13.4 oz)       Temperature:   Temp (24hrs), Av.2 °F (36.8 °C), Min:97.6 °F (36.4 °C), Max:98.5 °F (36.9 °C)    Temperature: 98.2 °F (36.8 °C)  Intake & Output:  I/O       None          Weights: There is no height or weight on file to calculate BMI. Weight (last 2 days)       Date/Time Weight    23 0600 148 (325.84)    23 0340 148 (325.84)          Physical Exam  Vitals and nursing note reviewed. Constitutional:       General: He is not in acute distress. Appearance: He is not toxic-appearing or diaphoretic. HENT:      Head: Normocephalic. Right Ear: External ear normal.      Left Ear: External ear normal.      Nose: Nose normal.      Mouth/Throat:      Mouth: Mucous membranes are dry. Pharynx: Oropharynx is clear. Eyes:      General: No scleral icterus. Extraocular Movements: Extraocular movements intact. Conjunctiva/sclera: Conjunctivae normal.      Pupils: Pupils are equal, round, and reactive to light. Cardiovascular:      Rate and Rhythm: Normal rate and regular rhythm. Heart sounds: Normal heart sounds. No murmur heard. No friction rub. No gallop. Pulmonary:      Breath sounds: No stridor. No wheezing, rhonchi or rales. Abdominal:      General: Abdomen is protuberant. Bowel sounds are increased. There is distension. Palpations: There is shifting dullness. Tenderness: There is no abdominal tenderness.    Musculoskeletal:         General: No tenderness. Cervical back: Normal range of motion. Right lower leg: 3+ Edema (Up tp knee joint.) present. Left lower leg: 3+ Edema (Up to knee joint.) present. Skin:     General: Skin is warm. Capillary Refill: Capillary refill takes less than 2 seconds. Coloration: Skin is not cyanotic, jaundiced or pale. Findings: No bruising, erythema, lesion, petechiae or rash. Comments: No telangiectasias, no palmar erythema, no icterus. Neurological:      General: No focal deficit present. Mental Status: He is alert and oriented to person, place, and time. Psychiatric:         Mood and Affect: Mood normal.         Behavior: Behavior normal.       LABORATORY DATA     Labs: I have personally reviewed pertinent reports. Results from last 7 days   Lab Units 11/03/23  0511 11/02/23  0937   WBC Thousand/uL 5.48 6.92   HEMOGLOBIN g/dL 8.9* 10.3*   HEMATOCRIT % 27.7* 32.2*   PLATELETS Thousands/uL 166 233   NEUTROS PCT % 62 69   MONOS PCT % 17* 14*   EOS PCT % 3 3      Results from last 7 days   Lab Units 11/03/23  0511 11/02/23  0937   POTASSIUM mmol/L 3.8 3.7   CHLORIDE mmol/L 99 98   CO2 mmol/L 28 31   BUN mg/dL 9 9   CREATININE mg/dL 0.64 0.80   CALCIUM mg/dL 7.9* 8.2*   ALK PHOS U/L 75 94   ALT U/L <3* 5*   AST U/L 25 25     Results from last 7 days   Lab Units 11/03/23  0511   MAGNESIUM mg/dL 1.7*     Results from last 7 days   Lab Units 11/03/23  0511   PHOSPHORUS mg/dL 3.4      Results from last 7 days   Lab Units 11/02/23  1002   INR  1.54*   PTT seconds 45*               IMAGING & DIAGNOSTIC TESTING     Radiology Results: I have personally reviewed pertinent reports. US abdomen limited    Result Date: 11/2/2023  Impression: Ascites visible in all 4 quadrants. Workstation performed: [id number]     Other Diagnostic Testing: I have personally reviewed pertinent reports.     ACTIVE MEDICATIONS     Current Facility-Administered Medications   Medication Dose Route Frequency bisacodyl (DULCOLAX) rectal suppository 10 mg  10 mg Rectal Daily PRN    enoxaparin (LOVENOX) subcutaneous injection 40 mg  40 mg Subcutaneous Daily    folic acid (FOLVITE) tablet 1 mg  1 mg Oral Daily    magnesium sulfate 2 g/50 mL IVPB (premix) 2 g  2 g Intravenous Once    melatonin tablet 3 mg  3 mg Oral HS    polyethylene glycol (MIRALAX) packet 17 g  17 g Oral Daily    thiamine tablet 100 mg  100 mg Oral Daily       VTE Pharmacologic Prophylaxis: Enoxaparin (Lovenox)  VTE Mechanical Prophylaxis: sequential compression device    Portions of the record may have been created with voice recognition software. Occasional wrong word or "sound a like" substitutions may have occurred due to the inherent limitations of voice recognition software. Read the chart carefully and recognize, using context, where substitutions have occurred.   ==  Greg Lopez MD  [id number]  Internal Medicine Residency PGY-1

## 2023-11-03 NOTE — BRIEF OP NOTE (RAD/CATH)
IR PARACENTESIS Procedure Note    PATIENT NAME: Manuel Ann  : 1965  MRN: [id number]    Pre-op Diagnosis:   1. Ascites of liver    2. Orthopnea    3. Hypoxia    4. Abdominal distention      Post-op Diagnosis:   1. Ascites of liver    2. Orthopnea    3. Hypoxia    4. Abdominal distention        Provider:   Sherren Mina, PA-C    Estimated Blood Loss: none    Findings: LLQ paracentesis, 19,250cc cloudy -> clear yellow over time    Specimens: collected and sent    Complications:  procedure terminated when patient stated he felt light headed. VSS.  More ascites remains    Anesthesia: local    Sherren Mina, PA-C     Date: 11/3/2023  Time: 3:37 PM

## 2023-11-03 NOTE — OCCUPATIONAL THERAPY NOTE
Occupational Therapy Cancel Note        Patient Name: Laisha Romero  [id number] Date: 11/3/2023     11/03/23 1132   OT Last Visit   OT Visit Date 11/03/23   Note Type   Note type Evaluation; Cancelled Session   Cancel Reasons Medical status   OT consulted, chart reviewed. Pt with significant ascites and abdominal distention, pt is undergoing parenthesis this afternoon. Will hold and attempt evaluation s/p procedure.  Bandar Gallo MOT, OTR/L

## 2023-11-03 NOTE — PHYSICAL THERAPY NOTE
PT cancel note       11/03/23 1154   PT Last Visit   PT Visit Date 11/03/23   Note Type   Note type Cancelled Session   Cancel Reasons Medical status     Desirae Ricks

## 2023-11-03 NOTE — UTILIZATION REVIEW
NOTIFICATION OF INPATIENT ADMISSION   AUTHORIZATION REQUEST   SERVICING FACILITY:   [address] [place] [address]  Address: [address] [place] [address] [address] [address]  Tax ID: [id number]  NPI: [id number] ATTENDING PROVIDER:  Attending Name and NPI#: Luann Kolb [[id number]]  Address: [address] [place] [address] [address] [address]  Phone: [phone]   ADMISSION INFORMATION:  Place of Service: Inpatient [address] [facility]  Place of Service Code: 21  Inpatient Admission Date/Time: 11/2/23 12:58 PM  Discharge Date/Time: No discharge date for patient encounter. Admitting Diagnosis Code/Description:  Orthopnea [R06.01]  Abdominal distention [R14.0]  Hypoxia [R09.02]  General weakness [R53.1]  Ascites of liver [R18.8]     UTILIZATION REVIEW CONTACT:  Gissel Freeman Utilization   Network Utilization Review Department  Phone: [phone]  Fax: [phone]  Email: [email or web address]  Contact for approvals/pending authorizations, clinical reviews, and discharge. PHYSICIAN ADVISORY SERVICES:  Medical Necessity Denial & Ypro-pr-Xnxi Review  Phone: [phone]  Fax: [phone]  Email: [email or web address]     DISCHARGE SUPPORT TEAM:  For Patients Discharge Needs & Updates  Phone: [phone] opt. 2 Fax: [phone]  Email: [email or web address]

## 2023-11-03 NOTE — SEDATION DOCUMENTATION
Left side paracentesis performed by Marquis Adam RAYA. 22067 mL cloudy yellow fluid obtained. Patient tolerated well.

## 2023-11-03 NOTE — ASSESSMENT & PLAN NOTE
Patient found to be anemic on presentation - hemoglobin improved to 9.5 following paracentesis. Previous iron panels notable for low normal iron, markedly low transferrin, elevated ferritin. No overt signs of bleeding at this time.     Plan  Continue to monitor, especially following further paracenteses

## 2023-11-03 NOTE — QUICK NOTE
Patient underwent paracentesis with removal of approx. 19 L of ascites; procedure terminated due to patient lightheadedness (per IR note, some ascites remains). After discussion with GI, will give 25% albumin 100 g now followed by another 50 g in 8 hours; follow-up BMP ordered for 2200, with CMP to be done tomorrow morning. Noted by nursing staff that patient was hypotensive post-paracentesis, likely in the setting of marked fluid removal. Will replete with albumin as above and continue to monitor.

## 2023-11-03 NOTE — PLAN OF CARE
Problem: Prexisting or High Potential for Compromised Skin Integrity  Goal: Skin integrity is maintained or improved  Description: INTERVENTIONS:  - Identify patients at risk for skin breakdown  - Assess and monitor skin integrity  - Assess and monitor nutrition and hydration status  - Monitor labs   - Assess for incontinence   - Turn and reposition patient  - Assist with mobility/ambulation  - Relieve pressure over bony prominences  - Avoid friction and shearing  - Provide appropriate hygiene as needed including keeping skin clean and dry  - Evaluate need for skin moisturizer/barrier cream  - Collaborate with interdisciplinary team   - Patient/family teaching  - Consider wound care consult   Outcome: Progressing     Problem: MOBILITY - ADULT  Goal: Maintain or return to baseline ADL function  Description: INTERVENTIONS:  -  Assess patient's ability to carry out ADLs; assess patient's baseline for ADL function and identify physical deficits which impact ability to perform ADLs (bathing, care of mouth/teeth, toileting, grooming, dressing, etc.)  - Assess/evaluate cause of self-care deficits   - Assess range of motion  - Assess patient's mobility; develop plan if impaired  - Assess patient's need for assistive devices and provide as appropriate  - Encourage maximum independence but intervene and supervise when necessary  - Involve family in performance of ADLs  - Assess for home care needs following discharge   - Consider OT consult to assist with ADL evaluation and planning for discharge  - Provide patient education as appropriate  Outcome: Progressing  Goal: Maintains/Returns to pre admission functional level  Description: INTERVENTIONS:  - Perform BMAT or MOVE assessment daily.   - Set and communicate daily mobility goal to care team and patient/family/caregiver.    - Collaborate with rehabilitation services on mobility goals if consulted  - Out of bed for toileting  - Record patient progress and toleration of activity level   Outcome: Progressing     Problem: PAIN - ADULT  Goal: Verbalizes/displays adequate comfort level or baseline comfort level  Description: Interventions:  - Encourage patient to monitor pain and request assistance  - Assess pain using appropriate pain scale  - Administer analgesics based on type and severity of pain and evaluate response  - Implement non-pharmacological measures as appropriate and evaluate response  - Consider cultural and social influences on pain and pain management  - Notify physician/advanced practitioner if interventions unsuccessful or patient reports new pain  Outcome: Progressing     Problem: INFECTION - ADULT  Goal: Absence or prevention of progression during hospitalization  Description: INTERVENTIONS:  - Assess and monitor for signs and symptoms of infection  - Monitor lab/diagnostic results  - Monitor all insertion sites, i.e. indwelling lines, tubes, and drains  - Monitor endotracheal if appropriate and nasal secretions for changes in amount and color  - [place] appropriate cooling/warming therapies per order  - Administer medications as ordered  - Instruct and encourage patient and family to use good hand hygiene technique  - Identify and instruct in appropriate isolation precautions for identified infection/condition  Outcome: Progressing  Goal: Absence of fever/infection during neutropenic period  Description: INTERVENTIONS:  - Monitor WBC    Outcome: Progressing     Problem: SAFETY ADULT  Goal: Maintain or return to baseline ADL function  Description: INTERVENTIONS:  -  Assess patient's ability to carry out ADLs; assess patient's baseline for ADL function and identify physical deficits which impact ability to perform ADLs (bathing, care of mouth/teeth, toileting, grooming, dressing, etc.)  - Assess/evaluate cause of self-care deficits   - Assess range of motion  - Assess patient's mobility; develop plan if impaired  - Assess patient's need for assistive devices and provide as appropriate  - Encourage maximum independence but intervene and supervise when necessary  - Involve family in performance of ADLs  - Assess for home care needs following discharge   - Consider OT consult to assist with ADL evaluation and planning for discharge  - Provide patient education as appropriate  Outcome: Progressing  Goal: Maintains/Returns to pre admission functional level  Description: INTERVENTIONS:  - Perform BMAT or MOVE assessment daily.   - Set and communicate daily mobility goal to care team and patient/family/caregiver.    - Collaborate with rehabilitation services on mobility goals if consulted  - Out of bed for toileting  - Record patient progress and toleration of activity level   Outcome: Progressing  Goal: Patient will remain free of falls  Description: INTERVENTIONS:  - Educate patient/family on patient safety including physical limitations  - Instruct patient to call for assistance with activity   - Consult OT/PT to assist with strengthening/mobility   - Keep Call bell within reach  - Keep bed low and locked with side rails adjusted as appropriate  - Keep care items and personal belongings within reach  - Initiate and maintain comfort rounds  - Make Fall Risk Sign visible to staff  - Apply yellow socks and bracelet for high fall risk patients  - Consider moving patient to room near nurses station  Outcome: Progressing     Problem: DISCHARGE PLANNING  Goal: Discharge to home or other facility with appropriate resources  Description: INTERVENTIONS:  - Identify barriers to discharge w/patient and caregiver  - Arrange for needed discharge resources and transportation as appropriate  - Identify discharge learning needs (meds, wound care, etc.)  - Arrange for interpretive services to assist at discharge as needed  - Refer to Case Management Department for coordinating discharge planning if the patient needs post-hospital services based on physician/advanced practitioner order or complex needs related to functional status, cognitive ability, or social support system  Outcome: Progressing     Problem: Knowledge Deficit  Goal: Patient/family/caregiver demonstrates understanding of disease process, treatment plan, medications, and discharge instructions  Description: Complete learning assessment and assess knowledge base.   Interventions:  - Provide teaching at level of understanding  - Provide teaching via preferred learning methods  Outcome: Progressing

## 2023-11-03 NOTE — UTILIZATION REVIEW
Initial Clinical Review    Admission: Date/Time/Statement:   Admission Orders (From admission, onward)       Ordered        11/02/23 1258  INPATIENT ADMISSION  Once                          Orders Placed This Encounter   Procedures    INPATIENT ADMISSION     Standing Status:   Standing     Number of Occurrences:   1     Order Specific Question:   Level of Care     Answer:   Med Surg [16]     Order Specific Question:   Estimated length of stay     Answer:   More than 2 Midnights     Order Specific Question:   Certification     Answer:   I certify that inpatient services are medically necessary for this patient for a duration of greater than two midnights. See H&P and MD Progress Notes for additional information about the patient's course of treatment. ED Arrival Information       Expected   -    Arrival   11/2/2023 09:21    Acuity   Urgent              Means of arrival   Ambulance    Escorted by   [facility]    Admission type   Emergency              Arrival complaint   weakness             Chief Complaint   Patient presents with    Medical Problem     C/o of distended abd with potential hernia per pt due to feeling a lump in his belly, and constipation. Has not taken laxatives, and states this has been going on for years. Initial Presentation: 66 y.o. male presented to ED via EMS as inpatient admission for decompensated hepatic cirrhosis. Patient noticed that he had increasing abdominal distention, felt like his abdomen was swelling up, had a hard time having a bowel movement. He took a 15 mL dose of milk of magnesia. He says right now feels short of breath, worse with laying flat and better with sitting up, feels like his abdomen distention is about the same as was from yesterday. He said he called an ambulance today because it was harder for him to get up and ambulate with his abdominal distention.  On exam Abdomen is distended and tympanitic, nontender, slight shifting dullness Patient will significant for type 1 autoimmune hepatitis with elevated IgG, JOSE E, and anti-smooth muscle antibody. Plan NPO  for large volume paracentesis with IR, NPO Will need post-procedure albumin infusion Ascites fluid analysis  Recommend abdominal imaging with IV contrast after paracentesis to eval hepatic vasculature and rule out HCCPossible IR-guided liver biopsy after resolution of acute symptom Check BMP after paracentesis Pending paracentesis with need re initiation of diuretics Thiamine, folic acid started lactulose titrated to 3-4 BM, patient not encephalopathic at this time and supportive care       Patient hospitalized April 11 through May 17, 2023 at outside facility, per discharge summary the patient presented with respiratory distress, no known past medical history. Was diagnosed with end-stage liver disease, recurrent ascites causing respiratory failure, hypoxia, hypercapnia and metabolic encephalopathy. He underwent thoracentesis, repeat thoracentesis and paracenteses, ultimately refused additional paracenteses,. Patient was noted to be depressed, it was felt he would not benefit from placement of a Pleurx catheter because of concerns regarding noncompliance and empyema, recommended the patient have frequent paracenteses as an outpatient. He was discharged home, recommendations for outpatient follow-up. Patient says that he lives with his family, since discharge he has not followed up, did not trust the care he received at the outside facility or the network and has been living at home       Date: 11-03-23    complaining of abdominal pain, for which he received Toradol. C/o no  bowel movement in 2 days. Continues with inspiratory and expiratory wheezing ANDERS (+) 2 b/l lower leg edema abdomen distended rounded taut and tender Patient in IR for paracentesis. Continues with SOB when laying down  IR:paracentesis with removal of approx.   19,250cc cloudy -> clear yellow over time procedure terminated due to patient lightheadedness (per IR note, some ascites remains). After discussion with GI, will give  IV 25% albumin 25 g every six hours for a total of five doses.  Patient noted to be hypotensive post-procedure, likely in the setting of significant volume removal. Continue close monitoring     ED Triage Vitals   Temperature Pulse Respirations Blood Pressure SpO2   11/02/23 0935 11/02/23 0934 11/02/23 0934 11/02/23 0934 11/02/23 0934   97.6 °F (36.4 °C) 101 20 (!) 154/104 96 %      Temp Source Heart Rate Source Patient Position - Orthostatic VS BP Location FiO2 (%)   11/02/23 0934 11/02/23 0934 11/02/23 1100 11/02/23 1100 --   Oral Monitor Lying Right arm       Pain Score       11/02/23 0934       5          Wt Readings from Last 1 Encounters:   11/03/23 (!) 148 kg (325 lb 13.4 oz)     Additional Vital Signs:   ate/Time Temp Pulse Resp BP MAP (mmHg) SpO2 Calculated FIO2 (%) - Nasal Cannula Nasal Cannula O2 Flow Rate (L/min) O2 Device Patient Position - Orthostatic VS   11/03/23 15:35:05 -- 86 -- 92/53 -- 96 % -- -- -- --   11/03/23 15:31:59 -- -- -- 89/54 Abnormal  -- -- -- -- -- --   11/03/23 15:28:18 -- -- -- 92/54 -- -- -- -- -- --   11/03/23 15:25:24 -- -- -- 95/50 -- -- -- -- -- --   11/03/23 15:12:40 -- -- -- 97/56 -- -- -- -- -- --   11/03/23 15:05:47 -- -- -- 96/52 -- -- -- -- -- --   11/03/23 13:37:08 -- 85 -- 107/64 -- 95 % -- -- -- --   11/03/23 07:21:03 98.2 °F (36.8 °C) 86 -- 100/64 76 94 % -- -- -- --   11/02/23 23:13:14 98.5 °F (36.9 °C) 91 -- 98/66 77 95 % -- -- -- --   11/02/23 23:12:31 98.5 °F (36.9 °C) 88 -- 98/66 77 94 % -- -- -- --   11/02/23 2055 -- -- -- -- -- -- 22 0.5 L/min Nasal cannula --   11/02/23 15:46:39 98.1 °F (36.7 °C) 98 -- 97/66 76 98 % -- -- -- --   11/02/23 15:45:30 98.1 °F (36.7 °C) 92 -- 97/66 76 93 % -- -- -- --   11/02/23 1200 -- 96 20 156/91 116 100 % 28 2 L/min Nasal cannula --   11/02/23 1110 -- -- -- -- -- 97 % 28 2 L/min Nasal cannula -- 11/02/23 1100 -- 102 -- 151/91 113 80 % Abnormal  -- -- -- Lying   11/02/23 1000 -- 98 20 144/84 107 98 % -- -- None (Room air) --   11/02/23 0935 97.6 °F (36.4 °C) -- -- -- -- --         Pertinent Labs/Diagnostic Test Results:   US abdomen limited   Final Result by Anatoliy Gordon MD (11/02 1237)      Ascites visible in all 4 quadrants. IR INPATIENT Paracentesis    (Results Pending)         Results from last 7 days   Lab Units 11/03/23  0511 11/02/23  0937   WBC Thousand/uL 5.48 6.92   HEMOGLOBIN g/dL 8.9* 10.3*   HEMATOCRIT % 27.7* 32.2*   PLATELETS Thousands/uL 166 233   NEUTROS ABS Thousands/µL 3.40 4.78         Results from last 7 days   Lab Units 11/03/23  0511 11/02/23  0937   SODIUM mmol/L 134* 134*   POTASSIUM mmol/L 3.8 3.7   CHLORIDE mmol/L 99 98   CO2 mmol/L 28 31   ANION GAP mmol/L 7 5   BUN mg/dL 9 9   CREATININE mg/dL 0.64 0.80   EGFR ml/min/1.73sq m 109 99   CALCIUM mg/dL 7.9* 8.2*   MAGNESIUM mg/dL 1.7*  --    PHOSPHORUS mg/dL 3.4  --      Results from last 7 days   Lab Units 11/03/23  0511 11/02/23  0937   AST U/L 25 25   ALT U/L <3* 5*   ALK PHOS U/L 75 94   TOTAL PROTEIN g/dL 6.5 7.4   ALBUMIN g/dL 1.9* 2.3*   TOTAL BILIRUBIN mg/dL 2.07* 2.37*   BILIRUBIN DIRECT mg/dL  --  1.02*         Results from last 7 days   Lab Units 11/03/23  0511 11/02/23  0937   GLUCOSE RANDOM mg/dL 71 91         Results from last 7 days   Lab Units 11/02/23  1002   PROTIME seconds 18.2*   INR  1.54*   PTT seconds 45*     Results from last 7 days   Lab Units 11/02/23  0937   LIPASE u/L 12       History reviewed. No pertinent past medical history.   Present on Admission:  **None**      Admitting Diagnosis: Orthopnea [R06.01]  Abdominal distention [R14.0]  Hypoxia [R09.02]  General weakness [R53.1]  Ascites of liver [R18.8]  Age/Sex: 66 y.o. male    Admission Orders:  Daily wt  I/O  SCD  PT/OT         Scheduled Medications:  enoxaparin, 40 mg, Subcutaneous, Daily  folic acid, 1 mg, Oral, Daily  melatonin, 3 mg, Oral, HS  polyethylene glycol, 17 g, Oral, Daily  thiamine, 100 mg, Oral, Daily      Continuous IV Infusions:     PRN Meds:  bisacodyl, 10 mg, Rectal, Daily PRN  lidocaine (PF), , , PRN        IP CONSULT TO GASTROENTEROLOGY  IP CONSULT TO CASE MANAGEMENT    Network Utilization Review Department  ATTENTION: Please call with any questions or concerns to [phone] and carefully listen to the prompts so that you are directed to the right person. All voicemails are confidential.   For Discharge needs, contact Care Management DC Support Team at [phone] opt. 2  Send all requests for admission clinical reviews, approved or denied determinations and any other requests to dedicated fax number below belonging to the campus where the patient is receiving treatment.  List of dedicated fax numbers for the Facilities:  [place] DENIALS (Administrative/Medical Necessity) [phone]   DISCHARGE SUPPORT TEAM (NETWORK) [address] [facility] (Maternity/NICU/Pediatrics) [phone]   [address] [facility] [phone]   [address] [place] [address] [address] [address] [facility] [address] [phone]

## 2023-11-04 LAB
ALBUMIN SERPL BCP-MCNC: 2.7 G/DL (ref 3.5–5)
ALP SERPL-CCNC: 64 U/L (ref 34–104)
ALT SERPL W P-5'-P-CCNC: <3 U/L (ref 7–52)
AMMONIA PLAS-SCNC: 73 UMOL/L (ref 18–72)
ANION GAP SERPL CALCULATED.3IONS-SCNC: 7 MMOL/L
AST SERPL W P-5'-P-CCNC: 26 U/L (ref 13–39)
BASOPHILS # BLD AUTO: 0.04 THOUSANDS/ÂΜL (ref 0–0.1)
BASOPHILS NFR BLD AUTO: 1 % (ref 0–1)
BILIRUB SERPL-MCNC: 2.27 MG/DL (ref 0.2–1)
BUN SERPL-MCNC: 10 MG/DL (ref 5–25)
CALCIUM ALBUM COR SERPL-MCNC: 9.1 MG/DL (ref 8.3–10.1)
CALCIUM SERPL-MCNC: 8.1 MG/DL (ref 8.4–10.2)
CHLORIDE SERPL-SCNC: 97 MMOL/L (ref 96–108)
CO2 SERPL-SCNC: 30 MMOL/L (ref 21–32)
CREAT SERPL-MCNC: 0.7 MG/DL (ref 0.6–1.3)
EOSINOPHIL # BLD AUTO: 0.16 THOUSAND/ÂΜL (ref 0–0.61)
EOSINOPHIL NFR BLD AUTO: 4 % (ref 0–6)
ERYTHROCYTE [DISTWIDTH] IN BLOOD BY AUTOMATED COUNT: 15.1 % (ref 11.6–15.1)
GFR SERPL CREATININE-BSD FRML MDRD: 105 ML/MIN/1.73SQ M
GLUCOSE SERPL-MCNC: 99 MG/DL (ref 65–140)
HCT VFR BLD AUTO: 23.8 % (ref 36.5–49.3)
HCT VFR BLD AUTO: 25.4 % (ref 36.5–49.3)
HGB BLD-MCNC: 7.8 G/DL (ref 12–17)
HGB BLD-MCNC: 8.1 G/DL (ref 12–17)
IMM GRANULOCYTES # BLD AUTO: 0.01 THOUSAND/UL (ref 0–0.2)
IMM GRANULOCYTES NFR BLD AUTO: 0 % (ref 0–2)
LYMPHOCYTES # BLD AUTO: 1.1 THOUSANDS/ÂΜL (ref 0.6–4.47)
LYMPHOCYTES NFR BLD AUTO: 24 % (ref 14–44)
MAGNESIUM SERPL-MCNC: 1.9 MG/DL (ref 1.9–2.7)
MCH RBC QN AUTO: 33.8 PG (ref 26.8–34.3)
MCHC RBC AUTO-ENTMCNC: 31.9 G/DL (ref 31.4–37.4)
MCV RBC AUTO: 106 FL (ref 82–98)
MONOCYTES # BLD AUTO: 0.73 THOUSAND/ÂΜL (ref 0.17–1.22)
MONOCYTES NFR BLD AUTO: 16 % (ref 4–12)
NEUTROPHILS # BLD AUTO: 2.55 THOUSANDS/ÂΜL (ref 1.85–7.62)
NEUTS SEG NFR BLD AUTO: 55 % (ref 43–75)
NRBC BLD AUTO-RTO: 0 /100 WBCS
PLATELET # BLD AUTO: 141 THOUSANDS/UL (ref 149–390)
PMV BLD AUTO: 8.3 FL (ref 8.9–12.7)
POTASSIUM SERPL-SCNC: 3.4 MMOL/L (ref 3.5–5.3)
PROT SERPL-MCNC: 6.4 G/DL (ref 6.4–8.4)
RBC # BLD AUTO: 2.4 MILLION/UL (ref 3.88–5.62)
SODIUM SERPL-SCNC: 134 MMOL/L (ref 135–147)
WBC # BLD AUTO: 4.59 THOUSAND/UL (ref 4.31–10.16)

## 2023-11-04 PROCEDURE — 97167 OT EVAL HIGH COMPLEX 60 MIN: CPT

## 2023-11-04 PROCEDURE — 99232 SBSQ HOSP IP/OBS MODERATE 35: CPT | Performed by: INTERNAL MEDICINE

## 2023-11-04 PROCEDURE — 85025 COMPLETE CBC W/AUTO DIFF WBC: CPT

## 2023-11-04 PROCEDURE — 85014 HEMATOCRIT: CPT

## 2023-11-04 PROCEDURE — 83735 ASSAY OF MAGNESIUM: CPT

## 2023-11-04 PROCEDURE — 97163 PT EVAL HIGH COMPLEX 45 MIN: CPT

## 2023-11-04 PROCEDURE — 82140 ASSAY OF AMMONIA: CPT

## 2023-11-04 PROCEDURE — 80053 COMPREHEN METABOLIC PANEL: CPT

## 2023-11-04 PROCEDURE — 85018 HEMOGLOBIN: CPT

## 2023-11-04 RX ORDER — SODIUM CHLORIDE, SODIUM GLUCONATE, SODIUM ACETATE, POTASSIUM CHLORIDE, MAGNESIUM CHLORIDE, SODIUM PHOSPHATE, DIBASIC, AND POTASSIUM PHOSPHATE .53; .5; .37; .037; .03; .012; .00082 G/100ML; G/100ML; G/100ML; G/100ML; G/100ML; G/100ML; G/100ML
500 INJECTION, SOLUTION INTRAVENOUS ONCE
Status: COMPLETED | OUTPATIENT
Start: 2023-11-04 | End: 2023-11-04

## 2023-11-04 RX ORDER — IBUPROFEN 400 MG/1
400 TABLET ORAL ONCE
Status: COMPLETED | OUTPATIENT
Start: 2023-11-04 | End: 2023-11-04

## 2023-11-04 RX ORDER — ALBUMIN, HUMAN INJ 5% 5 %
25 SOLUTION INTRAVENOUS ONCE
Status: DISCONTINUED | OUTPATIENT
Start: 2023-11-04 | End: 2023-11-07

## 2023-11-04 RX ORDER — ALBUMIN (HUMAN) 12.5 G/50ML
100 SOLUTION INTRAVENOUS ONCE
Status: CANCELLED | OUTPATIENT
Start: 2023-11-04

## 2023-11-04 RX ORDER — POTASSIUM CHLORIDE 20 MEQ/1
40 TABLET, EXTENDED RELEASE ORAL ONCE
Status: DISCONTINUED | OUTPATIENT
Start: 2023-11-04 | End: 2023-11-07

## 2023-11-04 RX ORDER — ONDANSETRON 2 MG/ML
4 INJECTION INTRAMUSCULAR; INTRAVENOUS EVERY 6 HOURS PRN
Status: DISCONTINUED | OUTPATIENT
Start: 2023-11-04 | End: 2023-11-12 | Stop reason: HOSPADM

## 2023-11-04 RX ADMIN — ONDANSETRON 4 MG: 2 INJECTION INTRAMUSCULAR; INTRAVENOUS at 12:58

## 2023-11-04 RX ADMIN — IBUPROFEN 400 MG: 400 TABLET, FILM COATED ORAL at 00:22

## 2023-11-04 RX ADMIN — ALBUMIN (HUMAN) 25 G: 0.25 INJECTION, SOLUTION INTRAVENOUS at 00:57

## 2023-11-04 RX ADMIN — SODIUM CHLORIDE, SODIUM GLUCONATE, SODIUM ACETATE, POTASSIUM CHLORIDE, MAGNESIUM CHLORIDE, SODIUM PHOSPHATE, DIBASIC, AND POTASSIUM PHOSPHATE 500 ML: .53; .5; .37; .037; .03; .012; .00082 INJECTION, SOLUTION INTRAVENOUS at 06:37

## 2023-11-04 NOTE — QUICK NOTE
SOD team notified about patient being hypotensive (81/47 MAP 58). Patient did have paracentesis and 19 L was drained from abdomen likely causing hypotension. Patient was given albumin 25% 100 g at 5 PM on 11/3, albumin 25% 25g at 1 AM on 11/4. Blood pressure 97/53 MAP 68. Will give additional albumin 5% 25 g for more volume. Continue to trend blood pressure with low threshold to reach out to critical care if needed. Patiently H/H was done to ensure no postoperative bleeding. Update 11/4 2:28 am: H/h 7.8 from 8.9. Will repeat at 0400. BP has been 96/53 (MAP 67). Giving albumin 5% 25g now. Update 11/4 3:00 am: Notified by nursing that patient was refusing last albumin. Went up to see patient and patient adamant about not taking albumin due to fear of feeling bloated again. A/O x3. At [facility] he was deemed not have capacity to make medical decisions for psych after refusing multiple procedures. Patient then proceeded to call his mother and brother on the phone. I personally then reached out to mother and brother and explained that he was hypotensive earlier requiring albumin. They both agreed that everything should be done in order to keep him stable. BP has been stable at 95/53 MAP 67.    -We will get ammonia level  -Repeat H/H at 4 AM  -May need formal neuropsych evaluation during hospital stay    Nursing later reached out to SOD to notify the patient's blood pressure had a MAP of 54. Went to see patient and patient continuously refused fluids. Patient states that the albumin made him feel poorly and he did not want it. Extensive risk-benefit discussion was had with patient about the consequences of hypotension including death. Critical care was also notified and critical care fellow had additional discussion about consequences of hypotension. Ultimately, patient agreed to 500 cc bolus of Isolyte.

## 2023-11-04 NOTE — PLAN OF CARE
Problem: Knowledge Deficit  Goal: Patient/family/caregiver demonstrates understanding of disease process, treatment plan, medications, and discharge instructions  Description: Complete learning assessment and assess knowledge base.   Interventions:  - Provide teaching at level of understanding  - Provide teaching via preferred learning methods  Outcome: Not Progressing

## 2023-11-04 NOTE — PHYSICAL THERAPY NOTE
Physical Therapy Evaluation     Patient's Name: Cesar Viera    Admitting Diagnosis  Orthopnea [R06.01]  Abdominal distention [R14.0]  Hypoxia [R09.02]  General weakness [R53.1]  Ascites of liver [R18.8]    Problem List  Patient Active Problem List   Diagnosis    Class 2 obesity in adult    Decompensated hepatic cirrhosis (HCC)    Constipation    Other ascites    Anemia       Past Medical History  History reviewed. No pertinent past medical history. Past Surgical History  History reviewed. No pertinent surgical history. 11/04/23 1150   PT Last Visit   PT Visit Date 11/04/23   Note Type   Note type Evaluation   Pain Assessment   Pain Assessment Tool 0-10   Pain Location/Orientation Location: Abdomen   Pain Onset/Description Descriptor: Pressure   Restrictions/Precautions   Weight Bearing Precautions Per Order No   Other Precautions Chair Alarm; Bed Alarm; Fall Risk;Pain;Multiple lines   Home Living   Type of [address] [place] [address] Multi-level;Elevator;Bed/bath upstairs   Bathroom Shower/Tub Tub/shower unit   Bathroom Toilet Standard   Bathroom Equipment   (Denies)   Home Equipment Wheelchair-manual;Walker;Cane   Additional Comments Pt states lives in [place] , but has elevator access inside house. 0STE   Prior Function   Level of [place] Independent with ADLs; Independent with functional mobility; Independent with IADLS   Lives With Other (Comment)  (Mother , Brother)   [place] Help From Family   IADLs Independent with driving; Independent with meal prep; Independent with medication management   Falls in the last 6 months 0   Vocational Other (Comment)  (Unable to work 2* medical issues)   Comments Pt reports being Ind for mobility and ADLs, was using walker at home, and recently got a w/c   General   Family/Caregiver Present No   Cognition   Overall Cognitive Status WFL   Arousal/Participation Alert   Orientation Level Oriented X4   Following Commands Follows one step commands without difficulty Comments Pt cooperative during session   Subjective   Subjective Agreeable to mobilize   RLE Assessment   RLE Assessment   (grossly 3/5)   LLE Assessment   LLE Assessment   (grossly 3/5)   Bed Mobility   Rolling R 4  Minimal assistance   Additional items Assist x 1;Bedrails; Increased time required   Rolling L 3  Moderate assistance   Additional items Assist x 1;Bedrails; Increased time required   Supine to Sit 3  Moderate assistance   Additional items Assist x 2;HOB elevated; Bedrails; Increased time required;Verbal cues;LE management   Sit to Supine 3  Moderate assistance   Additional items Assist x 2; Increased time required;Verbal cues;LE management; Bedrails   Additional Comments Pt noted to be in bed upon arrival.   Transfers   Sit to Stand Unable to assess   Additional Comments Pt able to get to EOB with assist X 2. Maintain sitting ~ 2-3 minutes. Pt unable to tolerate further upright sitting 2* increased abdominal discomfort and SOB , O2 sats on RA 90%. Unable to assess transfers  at this time. Endurance Deficit   Endurance Deficit Yes   Activity Tolerance   Activity Tolerance Patient limited by pain; Patient limited by fatigue   Medical Staff Made Aware Co-eval with OT 2* medical complexity and multiple co morbidities. Nurse Made Aware RN cleared pt for therapy   Assessment   Prognosis Fair   Problem List Decreased strength;Decreased endurance;Decreased mobility;Pain   Assessment Pt is a 66 y.o. male seen for PT evaluation s/p admit to [address] [place] [address] on 11/2/2023. Pt was admitted with a primary dx of: Decompensated hepatic cirrhosis, Constipation , Ascites, Anemia. s/p paracentesis during this admission. PT now consulted for assessment of mobility and d/c needs. Pt with Up and OOB as tolerated  orders. Pts current clinical presentation is Unstable/ Unpredictable (high complexity) due to Ongoing medical management for primary dx, Decreased activity tolerance compared to baseline, Fall risk, Increased assistance needed from caregiver at current time. Prior to admission, pt was Ind for mobility and ADLs. Pt lives with mother and brother in [facility], bed/bath on 2nd floor , but has elevator access inside house. Casey Axel Upon evaluation, pt currently is requiring Assist X 2 for bed mobility . Pt limited at this time 2* abdominal distention, inability to tolerate sitting upright , abdominal discomfort, SOB, fatigue. PT to see for further transfers and ambulation assessment as appropriate. Pt presents at PT eval functioning below baseline and currently w/ overall mobility deficits 2* to: impaired balance, decreased endurance, pain, decreased activity tolerance compared to baseline, decreased functional mobility tolerance compared to baseline, fall risk. Pt currently at a fall risk 2* to impairments listed above. Pt will continue to benefit from skilled acute PT interventions to address stated impairments; to maximize functional mobility; for ongoing pt/ family training; and DME needs. At conclusion of PT session pt returned BTB, bed alarm engaged, b/l side rails up, all needs in reach, and RN notified of session findings/recommendations with phone and call bell within reach. Pt denies any further questions at this time. The patient's AM-PAC Basic Mobility Inpatient Short Form Raw Score is 8. A Raw score of less than or equal to 16 suggests the patient may benefit from discharge to post-acute rehabilitation services. Please also refer to the recommendation of the Physical Therapist for safe discharge planning. Recommend Rehab upon hospital D/C. Barriers to Discharge Decreased caregiver support   Goals   Patient Goals to walk to the bathroom   STG Expiration Date 11/18/23   Short Term Goal #1 STG 1. Pt will be able to perform bed mobility tasks with Supervision in order to improve overall functional mobility and assist in safe d/c. STG 2.  Pt with sit EOB for at least 25 minutes at Ind level in order to strengthen abdominal musculature and assist in future transfers/ ambulation. STG 3 Pt will improve sitting/standing static/dynamic balance 1/2 grade in order to improve functional mobility and assist in safe d/c. STG 4 Pt will improve LE strength by 1/2 grade in order to improve functional mobility and assist in safe d/c.  PT to see for further transfer assessment. PT Treatment Day 0   Plan   Treatment/Interventions Functional transfer training; Therapeutic exercise;Patient/family training;Bed mobility;OT   PT Frequency 2-3x/wk   Discharge Recommendation   Rehab Resource Intensity Level, PT I (Maximum Resource Intensity)   Equipment Recommended   (TBD)   AM-PAC Basic Mobility Inpatient   Turning in Flat Bed Without Bedrails 2   Lying on Back to Sitting on Edge of Flat Bed Without Bedrails 2   Moving Bed to Chair 1   Standing Up From Chair Using Arms 1   Walk in Room 1   Climb 3-5 Stairs With Railing 1   Basic Mobility Inpatient Raw Score 8   Turning Head Towards Sound 3   Follow Simple Instructions 3   Low Function Basic Mobility Raw Score  14   Low Function Basic Mobility Standardized Score  22.01   Highest Level Of Mobility   JH-HLM Goal 3: Sit at edge of bed   JH-HLM Achieved 3: Sit at edge of bed   Modified [place] Scale   Modified Stevenson Scale 4   End of Consult   Patient Position at End of Consult All needs within reach; Other (comment); Bed/Chair alarm activated  (Bed)       Venancio White, PT DPT

## 2023-11-04 NOTE — PROGRESS NOTES
INTERNAL MEDICINE RESIDENCY PROGRESS NOTE     Name: Manuel Ann   Age & Sex: 66 y.o. male   MRN: [id number]  Unit/Bed#: -01   Encounter: [id number]  Team: SOD Team C     PATIENT INFORMATION     Name: Manuel Ann   Age & Sex: 66 y.o. male   MRN: [id number]  Hospital Stay Days: 2    ASSESSMENT/PLAN     Principal Problem:    Decompensated hepatic cirrhosis ([address])  Active Problems:    Constipation    Other ascites    Anemia      * Decompensated hepatic cirrhosis (HCC)  Assessment & Plan  MELD 3.0: 18 at 11/3/2023  5:11 AM  MELD-Na: 17 at 11/3/2023  5:11 AM  Calculated from:  Serum Creatinine: 0.64 mg/dL (Using min of 1 mg/dL) at 11/3/2023  5:11 AM  Serum Sodium: 134 mmol/L at 11/3/2023  5:11 AM  Total Bilirubin: 2.07 mg/dL at 11/3/2023  5:11 AM  Serum Albumin: 1.9 g/dL at 11/3/2023  5:11 AM  INR(ratio): 1.54 at 11/2/2023 10:02 AM  Age at listing (hypothetical): 66 years  Sex: Male at 11/3/2023  5:11 AM     In the past, patient's lab work up was suggestive of type 1 autoimmune hepatitis with elevated IgG, JOSE E, and anti-smooth muscle antibody. Patient has not had paracentesis since May 2023 and has not followed up with outpatient. Patient presently admitted with cirrhosis decompensated by marked ascites without evidence of encephalopathy.  As of today, patient remains with significant ascites on exam.    Plan:  -S/p large volume paracentesis, 19 L removed 11/3  -25% albumin 100 g given followed by another 50 g, attempted additional albumin but patient refused  -Overnight, received 500 mL bolus of Isolyte  -Follow-up ascites fluid studies  -Will try to coordinate liver biopsy to assist with diagnosis once paracentesis completed  -Thiamine, folic acid started  -Likely need additional abdominal imaging to rule out [address]  -GI consulted, appreciate recommendations   -Sodium and fluid restricted diet, ensure protein supplements  -Consider capacity evaluation with neuropsych considering lack of capacity in the past. Patient refusing albumin and not happy with treatment plan. Patient has history of lack of follow-up, refusing care, and non adherence making him a poor candidate for live transplant.   -Will likely need serial paracenteses considering fluid build up in abdomen. Anemia  Assessment & Plan  Patient found to be anemic on presentation - hemoglobin today at 8.9. Suspect that this is at least partially dilutional given patient's significant volume retention, although other causes not excluded. Previous iron panels notable for low normal iron, markedly low transferrin, elevated ferritin. Plan  Continue to monitor, especially following paracentesis  Will consider repeat iron studies in the future    Other ascites  Assessment & Plan  See plan for "decompensated hepatic cirrhosis"    Constipation  Assessment & Plan  Patient endorsing constipation for the last two days - likely in the setting of significant abdominal distention due to ascites. Overall, would expect symptoms to improve following paracentesis    Plan:  -Follow-up after paracentesis for resolution of constipation; consider further evaluation and intervention as indicated  -Miralax/Dulcolax ordered for time being          Disposition: requires further inpatient management, possible neuropsych eval for capacity? SUBJECTIVE     Patient seen and examined at bedside this morning. Overnight, patient was hypotensive down to 81/47. He had paracentesis with 19 L drained from abdomen. Patient received albumin x 2 following paracentesis. He refused an additional albumin bolus but agreed to 500 mL bolus isolyte. Patient was not happy that we removed fluid "just to put it right back in again." States he feels nauseous following albumin infusions. Denies chest pain, SOB, vomiting, diarrhea, lightheadedness. Will likely need neuropsych eval 2/2 refusing care and not realistic about current condition.  During conversation, patient has been tangential and perseverates about previous hospital admission to [facility]. Remains alert and afebrile. Otherwise, no additional complaints or concerns. OBJECTIVE     Vitals:    23 0232 23 0316 23 0546 23 0725   BP: 95/53 104/58 (!) 79/42 108/62   Pulse: 80  74 80   Resp:    16   Temp:    (!) 97.3 °F (36.3 °C)   TempSrc:       SpO2: 90%  92% 92%   Weight:          Temperature:   Temp (24hrs), Av °F (36.7 °C), Min:97.3 °F (36.3 °C), Max:98.5 °F (36.9 °C)    Temperature: (!) 97.3 °F (36.3 °C)  Intake & Output:  I/O          0701   07 07 07    Urine (mL/kg/hr)  100 (0)    Other  67948    Total Output  09134    Net  [id number]          Unmeasured Urine Occurrence  1 x          Weights: There is no height or weight on file to calculate BMI. Weight (last 2 days)       Date/Time Weight    23 0600 148 (325.84)    23 0340 148 (325.84)            Physical Exam  Vitals and nursing note reviewed. Constitutional:       General: He is not in acute distress. Appearance: He is obese. He is not ill-appearing or diaphoretic. HENT:      Head: Normocephalic and atraumatic. Mouth/Throat:      Mouth: Mucous membranes are dry. Pharynx: Oropharynx is clear. Eyes:      General: No scleral icterus. Conjunctiva/sclera: Conjunctivae normal.   Cardiovascular:      Rate and Rhythm: Normal rate and regular rhythm. Pulses: Normal pulses. Heart sounds: No murmur heard. Pulmonary:      Effort: Pulmonary effort is normal. No respiratory distress. Breath sounds: Normal breath sounds. No wheezing. Abdominal:      General: There is distension. Palpations: Abdomen is rigid. There is fluid wave. Musculoskeletal:         General: Normal range of motion. Right lower leg: No edema. Left lower leg: No edema. Skin:     General: Skin is warm. Capillary Refill: Capillary refill takes less than 2 seconds. Coloration: Skin is not jaundiced. Neurological:      General: No focal deficit present. Mental Status: He is alert. Psychiatric:         Mood and Affect: Mood normal.         Speech: Speech is tangential.       LABORATORY DATA     Labs: I have personally reviewed pertinent reports. Results from last 7 days   Lab Units 11/04/23  0357 11/04/23  0153 11/03/23  0511 11/02/23  0937   WBC Thousand/uL 4.59  --  5.48 6.92   HEMOGLOBIN g/dL 8.1* 7.8* 8.9* 10.3*   HEMATOCRIT % 25.4* 23.8* 27.7* 32.2*   PLATELETS Thousands/uL 141*  --  166 233   NEUTROS PCT % 55  --  62 69   MONOS PCT % 16*  --  17* 14*   EOS PCT % 4  --  3 3      Results from last 7 days   Lab Units 11/04/23  0357 11/03/23  2158 11/03/23  0511 11/02/23  0937   POTASSIUM mmol/L 3.4* 3.7 3.8 3.7   CHLORIDE mmol/L 97 99 99 98   CO2 mmol/L 30 31 28 31   BUN mg/dL 10 10 9 9   CREATININE mg/dL 0.70 0.75 0.64 0.80   CALCIUM mg/dL 8.1* 8.1* 7.9* 8.2*   ALK PHOS U/L 64  --  75 94   ALT U/L <3*  --  <3* 5*   AST U/L 26  --  25 25     Results from last 7 days   Lab Units 11/04/23  0357 11/03/23  0511   MAGNESIUM mg/dL 1.9 1.7*     Results from last 7 days   Lab Units 11/03/23  0511   PHOSPHORUS mg/dL 3.4      Results from last 7 days   Lab Units 11/02/23  1002   INR  1.54*   PTT seconds 45*               IMAGING & DIAGNOSTIC TESTING     Radiology Results: I have personally reviewed pertinent reports. US abdomen limited    Result Date: 11/2/2023  Impression: Ascites visible in all 4 quadrants. Workstation performed: [id number]     Other Diagnostic Testing: I have personally reviewed pertinent reports.     ACTIVE MEDICATIONS     Current Facility-Administered Medications   Medication Dose Route Frequency    albumin human (FLEXBUMIN) 5 % injection 25 g  25 g Intravenous Once    bisacodyl (DULCOLAX) rectal suppository 10 mg  10 mg Rectal Daily PRN    enoxaparin (LOVENOX) subcutaneous injection 40 mg  40 mg Subcutaneous Daily    folic acid (FOLVITE) tablet 1 mg  1 mg Oral Daily    melatonin tablet 3 mg  3 mg Oral HS    polyethylene glycol (MIRALAX) packet 17 g  17 g Oral Daily    potassium chloride (K-DUR,KLOR-CON) CR tablet 40 mEq  40 mEq Oral Once    thiamine tablet 100 mg  100 mg Oral Daily       VTE Pharmacologic Prophylaxis: Enoxaparin  VTE Mechanical Prophylaxis: sequential compression device    Portions of the record may have been created with voice recognition software. Occasional wrong word or "sound a like" substitutions may have occurred due to the inherent limitations of voice recognition software.   Read the chart carefully and recognize, using context, where substitutions have occurred.  ==  Hansel Zafar MD  [address] [facility]  Internal Medicine Residency PGY-II

## 2023-11-04 NOTE — PROGRESS NOTES
Alicja FIORE notified about low blood pressure of 81/47 HR 76 MAP 58. Angela and doctor at bedside and ordered albumin, monitored blood pressure every 20 min and to notify if MAP is less than 65. Pt is asymptomatic with no complaints.

## 2023-11-04 NOTE — OCCUPATIONAL THERAPY NOTE
Occupational Therapy Evaluation     Patient Name: Tiara Cadena  [id number] Date: 11/4/2023  Problem List  Principal Problem:    Decompensated hepatic cirrhosis ([address])  Active Problems:    Constipation    Other ascites    Anemia    Past Medical History  History reviewed. No pertinent past medical history. Past Surgical History  History reviewed. No pertinent surgical history. 11/04/23 1148   OT Last Visit   OT Visit Date 11/04/23   Note Type   Note type Evaluation   Pain Assessment   Pain Assessment Tool FLACC   Pain Score 2   Pain Location/Orientation Location: Abdomen   Pain Onset/Description Descriptor: Pressure   Pain Rating: FLACC (Rest) - Face 1   Pain Rating: FLACC (Rest) - Legs 0   Pain Rating: FLACC (Rest) - Activity 0   Pain Rating: FLACC (Rest) - Cry 1   Pain Rating: FLACC (Rest) - Consolability 0   Score: FLACC (Rest) 2   Pain Rating: FLACC (Activity) - Face 1   Pain Rating: FLACC (Activity) - Legs 1   Pain Rating: FLACC (Activity) - Activity 1   Pain Rating: FLACC (Activity) - Cry 1   Pain Rating: FLACC (Activity) - Consolability 1   Score: FLACC (Activity) 5   Restrictions/Precautions   Weight Bearing Precautions Per Order No   Other Precautions Chair Alarm; Bed Alarm; Fall Risk;Pain;Multiple lines  (distended abdomen)   Home Living   Type of [address] [place] [address]  Multi-level;Elevator;Bed/bath upstairs   Bathroom Shower/Tub Tub/shower unit   Bathroom Toilet Standard   Bathroom Equipment   (denies)   Home Equipment Wheelchair-manual;Walker;Cane   Additional Comments Pt resides with mother and brother in [facility] with 0 step and elevator access   Prior Function   Level of Sylvia Independent with ADLs; Independent with functional mobility; Independent with IADLS   Lives With [facility] Help From Family   IADLs Independent with medication management; Independent with meal prep; Independent with driving   Falls in the last 6 months 0   Vocational Unemployed  (unable to work 2* to medical issues)   Comments PTA, Pt reports being I with ADLs/IADLs/. Pt reports being I with RW, however, recently purchased a W/C. Lifestyle   Autonomy I with ADLs and use of RW for functional mobility   Reciprocal Relationships Supportive family   Service to Others Unemployed   Intrinsic Gratification WIll cont. to assess   ADL   Where Assessed Edge of bed   Eating Assistance 5  Supervision/Setup   Grooming Assistance 4  Minimal Assistance   UB Bathing Assistance 2  Maximal Assistance   LB Bathing Assistance 1  Total Assistance   [address] 2  Maximal Assistance   UB Dressing Deficit Setup; Increased time to complete;Supervision/safety; Thread RUE; Thread LUE; Fasteners   LB Dressing Assistance 1  Total Assistance   LB Dressing Deficit Don/doff L sock; Don/doff R sock   Toileting Assistance  1  Total Assistance   Functional Assistance 2  Maximal Assistance   Additional Comments Pt overall limited in ADLs 2* distended abdomen, inability to tolerate sitting, poor sitting balance, and decreased endurance. Bed Mobility   Rolling R 4  Minimal assistance   Additional items Assist x 1;Bedrails; Increased time required;Verbal cues   Rolling L 3  Moderate assistance   Additional items Assist x 1; Increased time required;Verbal cues;LE management   Supine to Sit 3  Moderate assistance   Additional items Assist x 2; Increased time required;Verbal cues;LE management   Sit to Supine 3  Moderate assistance   Additional items Assist x 2; Increased time required;Verbal cues;LE management   Additional Comments Pt greeted supine in bed. Pt sat on EOB with min A with trunk control and requires A from bed rail. Pt unable to tolerate further sitting at this time 2* to SOB, however, O2 sats 90%. Pt also reports further discomfort in abdomen. Transfers   Sit to Stand Unable to assess   Stand to Sit Unable to assess   Functional Mobility   Additional Comments Not appropriate at this time.    Balance   Static Sitting Fair - Dynamic Sitting Poor +   Activity Tolerance   Activity Tolerance Patient limited by fatigue;Patient limited by pain   Medical Staff Made Aware Co-eval with JAVI Kelley 2* to Pt's medical complexity and decreased endurance,   Nurse Made Aware RN cleared/updated. RUE Assessment   RUE Assessment WFL   LUE Assessment   LUE Assessment WFL   Hand Function   Gross Motor Coordination Functional   Fine Motor Coordination Functional   Sensation   Light Touch No apparent deficits   Psychosocial   Psychosocial (WDL) WDL   Cognition   Overall Cognitive Status WFL   Arousal/Participation Alert; Cooperative   Attention Attends with cues to redirect   Orientation Level Oriented X4   Memory Decreased recall of precautions   Following Commands Follows one step commands without difficulty   Comments Pt overall pleasant and cooperative during OT session. Pt eager to work with therapy. Pt talkactive and requires redirection to task. Assessment   Limitation Decreased ADL status; Decreased UE ROM; Decreased UE strength;Decreased Safe judgement during ADL;Decreased cognition;Decreased endurance;Decreased self-care trans;Decreased high-level ADLs   Prognosis Fair   Assessment Pt is a 66 y.o. male who [facility] to [facility] on 11/2/2023 with distended abdomen. Pt with diagnosis of decompensated hepatic cirrhosis. Pt s/p IR paracentesis on 11/3. Pt with PMHx of anemia, ascites and constipation. Pt greeted bedside for OT evaluation on 11/4/2023. Pt resides with mother and brother in a 3[address] with 0 step and elevator access. PTA, Pt reports being I with ADLs/IADLs/. Pt reports being I with RW, however, recently purchased a W/C. Pt demonstrating the following occupational deficits: max A with UB ADLs, total assist with LB ADLs, min-mod A with Rolling in bed, and mod Ax2 with bed mobility.  Limitations that impact functional performance include decreased ADL status, decreased UE ROM, decreased UE strength, decreased safe judgement during ADLs, decreased cognition, decreased endurance, decreased sensation, decreased self care transfers, decreased high level ADLs, and pain. Occupational performance areas to address ADL retraining, functional transfer training, UE strengthening/ROM, endurance training, cognitive reorientation, Pt/caregiver education, equipment evaluation/education, compensatory technique education, energy conservation, and activity engagement . Pt would benefit from continued skilled OT services while in hospital to maximize independence with ADLs. Will continue to follow Pt's progress. Pt would benefit from post acute rehabilitation services upon DC to maximize safety and independence with ADLs and functional tasks of choice. Goals   Patient Goals To walk to the bathroom. LT Time Frame 10-14   Long Term Goal #1 See goals listed below. Plan   Treatment Interventions ADL retraining;Functional transfer training;UE strengthening/ROM; Endurance training;Cognitive reorientation;Patient/family training;Equipment evaluation/education; Compensatory technique education; Energy conservation; Activityengagement   Goal Expiration Date 11/18/23   OT Frequency 2-3x/wk   Discharge Recommendation   Rehab Resource Intensity Level, OT I (Maximum Resource Intensity)   Additional Comments  The patient's raw score on the AM-PAC Daily Activity Inpatient Short Form is 11. A raw score of less than 19 suggests the patient may benefit from discharge to post-acute rehabilitation services. Please refer to the recommendation of the Occupational Therapist for safe discharge planning.    AM-PAC Daily Activity Inpatient   Lower Body Dressing 1   Bathing 2   Toileting 1   Upper Body Dressing 2   Grooming 2   Eating 3   Daily Activity Raw Score 11   Daily Activity Standardized Score (Calc for Raw Score >=11) 29.04   AM-PAC Applied Cognition Inpatient   Following a Speech/Presentation 3   Understanding Ordinary Conversation 4   Taking Medications 3   Remembering Where Things Are Placed or Put Away 3   Remembering List of 4-5 Errands 3   Taking Care of Complicated Tasks 3   Applied Cognition Raw Score 19   Applied Cognition Standardized Score 39.77   End of Consult   Education Provided Yes   Patient Position at End of Consult Supine;Bed/Chair alarm activated; All needs within reach   Nurse Communication Nurse aware of consult       Goals (OTR to assess for further functional transfers/mobility):  Pt will complete UB ADLs with min A in order to maximize participation with ADLs. Pt will complete LB ADLs with min A in order to maximize safety with ADLs. Pt will complete hygiene and grooming with I in order to increase participation with ADLs. Pt will complete toileting routine (transfer, hygiene, and clothing management) with min A in order to return to prior level of function. Pt will complete bed mobility with min A in order to maximize participation with ADLs. Pt will complete functional transfers at min A level in order to increase participation with ADLs. Pt will increase dynamic standing balance to F in order to increase safety with ADLs. Pt will increase dynamic sitting balance to F in order to increased participation with seated ADLs. Pt will be attentive 100% of the time for ongoing functional/formal cognitive assessment to assist with safe dc planning prn.       Lisandra Rosales MS, OTR/L

## 2023-11-04 NOTE — PLAN OF CARE
Problem: Prexisting or High Potential for Compromised Skin Integrity  Goal: Skin integrity is maintained or improved  Description: INTERVENTIONS:  - Identify patients at risk for skin breakdown  - Assess and monitor skin integrity  - Assess and monitor nutrition and hydration status  - Monitor labs   - Assess for incontinence   - Turn and reposition patient  - Assist with mobility/ambulation  - Relieve pressure over bony prominences  - Avoid friction and shearing  - Provide appropriate hygiene as needed including keeping skin clean and dry  - Evaluate need for skin moisturizer/barrier cream  - Collaborate with interdisciplinary team   - Patient/family teaching  - Consider wound care consult   Outcome: Progressing     Problem: MOBILITY - ADULT  Goal: Maintain or return to baseline ADL function  Description: INTERVENTIONS:  -  Assess patient's ability to carry out ADLs; assess patient's baseline for ADL function and identify physical deficits which impact ability to perform ADLs (bathing, care of mouth/teeth, toileting, grooming, dressing, etc.)  - Assess/evaluate cause of self-care deficits   - Assess range of motion  - Assess patient's mobility; develop plan if impaired  - Assess patient's need for assistive devices and provide as appropriate  - Encourage maximum independence but intervene and supervise when necessary  - Involve family in performance of ADLs  - Assess for home care needs following discharge   - Consider OT consult to assist with ADL evaluation and planning for discharge  - Provide patient education as appropriate  Outcome: Progressing  Goal: Maintains/Returns to pre admission functional level  Description: INTERVENTIONS:  - Perform BMAT or MOVE assessment daily.   - Set and communicate daily mobility goal to care team and patient/family/caregiver. - Collaborate with rehabilitation services on mobility goals if consulted  - Perform Range of Motion 3 times a day.   - Reposition patient every 2 hours.  - Dangle patient 3 times a day  - Stand patient 3 times a day  - Ambulate patient 3 times a day  - Out of bed to chair 3 times a day   - Out of bed for meals 3 times a day  - Out of bed for toileting  - Record patient progress and toleration of activity level   Outcome: Progressing     Problem: PAIN - ADULT  Goal: Verbalizes/displays adequate comfort level or baseline comfort level  Description: Interventions:  - Encourage patient to monitor pain and request assistance  - Assess pain using appropriate pain scale  - Administer analgesics based on type and severity of pain and evaluate response  - Implement non-pharmacological measures as appropriate and evaluate response  - Consider cultural and social influences on pain and pain management  - Notify physician/advanced practitioner if interventions unsuccessful or patient reports new pain  Outcome: Progressing     Problem: INFECTION - ADULT  Goal: Absence or prevention of progression during hospitalization  Description: INTERVENTIONS:  - Assess and monitor for signs and symptoms of infection  - Monitor lab/diagnostic results  - Monitor all insertion sites, i.e. indwelling lines, tubes, and drains  - Monitor endotracheal if appropriate and nasal secretions for changes in amount and color  - [place] appropriate cooling/warming therapies per order  - Administer medications as ordered  - Instruct and encourage patient and family to use good hand hygiene technique  - Identify and instruct in appropriate isolation precautions for identified infection/condition  Outcome: Progressing  Goal: Absence of fever/infection during neutropenic period  Description: INTERVENTIONS:  - Monitor WBC    Outcome: Progressing     Problem: SAFETY ADULT  Goal: Maintain or return to baseline ADL function  Description: INTERVENTIONS:  -  Assess patient's ability to carry out ADLs; assess patient's baseline for ADL function and identify physical deficits which impact ability to perform ADLs (bathing, care of mouth/teeth, toileting, grooming, dressing, etc.)  - Assess/evaluate cause of self-care deficits   - Assess range of motion  - Assess patient's mobility; develop plan if impaired  - Assess patient's need for assistive devices and provide as appropriate  - Encourage maximum independence but intervene and supervise when necessary  - Involve family in performance of ADLs  - Assess for home care needs following discharge   - Consider OT consult to assist with ADL evaluation and planning for discharge  - Provide patient education as appropriate  Outcome: Progressing  Goal: Maintains/Returns to pre admission functional level  Description: INTERVENTIONS:  - Perform BMAT or MOVE assessment daily.   - Set and communicate daily mobility goal to care team and patient/family/caregiver. - Collaborate with rehabilitation services on mobility goals if consulted  - Perform Range of Motion 3 times a day. - Reposition patient every 2 hours.   - Dangle patient 3 times a day  - Stand patient 3 times a day  - Ambulate patient 3 times a day  - Out of bed to chair 3 times a day   - Out of bed for meals 3 times a day  - Out of bed for toileting  - Record patient progress and toleration of activity level   Outcome: Progressing  Goal: Patient will remain free of falls  Description: INTERVENTIONS:  - Educate patient/family on patient safety including physical limitations  - Instruct patient to call for assistance with activity   - Consult OT/PT to assist with strengthening/mobility   - Keep Call bell within reach  - Keep bed low and locked with side rails adjusted as appropriate  - Keep care items and personal belongings within reach  - Initiate and maintain comfort rounds  - Make Fall Risk Sign visible to staff  - Offer Toileting every 2 Hours, in advance of need  - Initiate/Maintain bed alarm  - Obtain necessary fall risk management equipment:   - Apply yellow socks and bracelet for high fall risk patients  - Consider moving patient to room near nurses station  Outcome: Progressing     Problem: DISCHARGE PLANNING  Goal: Discharge to home or other facility with appropriate resources  Description: INTERVENTIONS:  - Identify barriers to discharge w/patient and caregiver  - Arrange for needed discharge resources and transportation as appropriate  - Identify discharge learning needs (meds, wound care, etc.)  - Arrange for interpretive services to assist at discharge as needed  - Refer to Case Management Department for coordinating discharge planning if the patient needs post-hospital services based on physician/advanced practitioner order or complex needs related to functional status, cognitive ability, or social support system  Outcome: Progressing     Problem: Knowledge Deficit  Goal: Patient/family/caregiver demonstrates understanding of disease process, treatment plan, medications, and discharge instructions  Description: Complete learning assessment and assess knowledge base.   Interventions:  - Provide teaching at level of understanding  - Provide teaching via preferred learning methods  Outcome: Progressing

## 2023-11-04 NOTE — PROGRESS NOTES
[facility] - Inpatient Progress Note    PATIENT INFORMATION      Garold Mcardle 66 y.o. male MRN: [id number]  Unit/Bed#: -01 Encounter: [id number]    ASSESSMENT & PLAN   Garold Mcardle is a 66 y.o. 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 GI consulted for further evaluation and management. Decompensated Cirrhosis  Autoimmune Hepatitis  Patient with recent hospitalization at [facility] with management of AIH and Decompensated cirrhosis but was lost to follow up. Unfortunately, patient nonadherent with medication regimen at discharge presenting again with decompensation in setting of significant ascites. Currently LFT's are normal, and doubt acute autoimmune hepatitis, instead favor decompensation of underlying cirrhosis. MELD 3.0: 17 at 11/4/2023  3:57 AM  MELD-Na: 17 at 11/4/2023  3:57 AM  Calculated from:  Serum Creatinine: 0.70 mg/dL (Using min of 1 mg/dL) at 11/4/2023  3:57 AM  Serum Sodium: 134 mmol/L at 11/4/2023  3:57 AM  Total Bilirubin: 2.27 mg/dL at 11/4/2023  3:57 AM  Serum Albumin: 2.7 g/dL at 11/4/2023  3:57 AM  INR(ratio): 1.54 at 11/2/2023 10:02 AM  Age at listing (hypothetical): 66 years  Sex: Male at 11/4/2023  3:57 AM  Etiology - prior workup with elevated IgG, JOSE E, and antismooth muscle antibody indicating underlying autoimmune hepatitis - 14 on AIH scoring system. No prior liver biopsy to confirm or evaluate underlying fibrosis/cirrhosis.   Monitor daily CMP/INR  Varices  Has never had EGD per pt  On chart review may have had EGD 4/21/23 by Dr. Riaz Malagon at [facility], however unable to view or retrieve report, discharge summary does not report any EGD being performed   Trend CBC        No indication at this time for inpatient EGD but will discuss with patient and family pending treatment of large volume ascites  HE  None  No indication to check or trend ammonia levels, pt is not encephalopathic and has no asterixis  Ascites  Was requiring frequent paracenteses and thoracenteses at [facility] 4/2023-5/2023  Was recommended for weekly paracenteses as an outpt as well as diuretic therapy - however, patient lost to follow up  Patient now s/p IR paracentesis with removal of 19L fluid with subsequent hypotension - received 150g albumin, and refused further repletion - did accept 500cc bolus fluid with improvement in BP  Negative for SBP  Currently not a candidate for diuretic therapy in setting of hypotension  Cr stable on AM labs  Would recommend salt restriction  Has not had a paracentesis since May 2023 per pt  [address] Screen  Will need abdominal imaging with IV contrast at some point to evaluate vasculature and confirm no [address]  Transplant  Likely not a candidate in setting of medication nonadherence and poor follow up  Would need to establish outpatient follow up to review case and make determination    Patient with refusal of care overnight, and appears this is regular occurrence for him based on chart review. Agree with possible evaluation for competency in light of [facility] records declaring lack of capacity. Attempted to educate patient in regard to underlying condition and need for ongoing medical treatment at bedside. Unfortunately, he is tangential and perseverates on events of prior hospitalization. Will continue to emphasize importance of management of liver disease and need for adequate follow up. SUBJECTIVE     Patient seen and evaluated at bedside. Reports he feels abdomen is filling with fluid again. Frustrated with underlying conditions. Reports he is not trusting of physicians, and was unhappy with prior hospitalization this spring. Very tangential in discussions. Events of last night reviewed in regard to hypotension and refusal of care by patient. MEDICATIONS & ALLERGIES       Medications:   No medications prior to admission.      Current Facility-Administered Medications   Medication Dose Route Frequency    albumin human (FLEXBUMIN) 5 % injection 25 g  25 g Intravenous Once    bisacodyl (DULCOLAX) rectal suppository 10 mg  10 mg Rectal Daily PRN    enoxaparin (LOVENOX) subcutaneous injection 40 mg  40 mg Subcutaneous Daily    folic acid (FOLVITE) tablet 1 mg  1 mg Oral Daily    melatonin tablet 3 mg  3 mg Oral HS    polyethylene glycol (MIRALAX) packet 17 g  17 g Oral Daily    potassium chloride (K-DUR,KLOR-CON) CR tablet 40 mEq  40 mEq Oral Once    thiamine tablet 100 mg  100 mg Oral Daily       Allergies:   No Known Allergies    PHYSICAL EXAM     Objective   Blood pressure 108/62, pulse 80, temperature (!) 97.3 °F (36.3 °C), resp. rate 16, weight (!) 148 kg (325 lb 13.4 oz), SpO2 92 %. There is no height or weight on file to calculate BMI. Intake/Output Summary (Last 24 hours) at 11/4/2023 6676  Last data filed at 11/3/2023 1601  Gross per 24 hour   Intake --   Output 33052 ml   Net -99738 ml       General Appearance:   Alert, cooperative, no distress   HEENT:   Normocephalic, atraumatic, anicteric     Neck:   Supple, symmetrical, trachea midline   Lungs:   Equal chest rise, respirations unlabored    Heart:   Regular rate and rhythm   Abdomen:   Soft, non-tender, distended; normal bowel sounds   Rectal:   Deferred    Extremities:   No cyanosis, clubbing or edema    Neuro:    Moves all 4 extremities    Skin:   No jaundice, rashes, or lesions      ADDITIONAL DATA     Lab Results:     Results from last 7 days   Lab Units 11/04/23  0357   WBC Thousand/uL 4.59   HEMOGLOBIN g/dL 8.1*   HEMATOCRIT % 25.4*   PLATELETS Thousands/uL 141*   NEUTROS PCT % 55   LYMPHS PCT % 24   MONOS PCT % 16*   EOS PCT % 4     Results from last 7 days   Lab Units 11/04/23  0357   POTASSIUM mmol/L 3.4*   CHLORIDE mmol/L 97   CO2 mmol/L 30   BUN mg/dL 10   CREATININE mg/dL 0.70   CALCIUM mg/dL 8.1*   ALK PHOS U/L 64   ALT U/L <3*   AST U/L 26     Results from last 7 days   Lab Units 11/02/23  1002   INR  1.54*       Imaging:    US abdomen limited    Result Date: 11/2/2023  Narrative: RIGHT UPPER QUADRANT ULTRASOUND INDICATION:    abd distention, eval for ascities. COMPARISON:  None. TECHNIQUE:   Real-time Limited ultrasound of the abdomen was performed with a curvilinear transducer with both volumetric sweeps and still imaging techniques. FINDINGS: ASCITES:   Present in all 4 quadrants. Impression: Ascites visible in all 4 quadrants. Workstation performed: [id number]       EKG, Pathology, and Other Studies Reviewed on Admission:   EKG: Reviewed      Counseling / Coordination of Care Time: 30 total mins spent n consult. Greater than 50% of total time spent on patient counseling and coordination of care. Gonzales Amezcua DO  Gastroenterology Fellow  [facility]  Division of Gastroenterology and Hepatology  Available on [employer]  . ..............................................................................................................................................  ** Please Note: This note is constructed using a voice recognition dictation system.  **

## 2023-11-04 NOTE — PLAN OF CARE
Problem: OCCUPATIONAL THERAPY ADULT  Goal: Performs self-care activities at highest level of function for planned discharge setting. See evaluation for individualized goals. Description: Treatment Interventions: ADL retraining, Functional transfer training, UE strengthening/ROM, Endurance training, Cognitive reorientation, Patient/family training, Equipment evaluation/education, Compensatory technique education, Energy conservation, Activityengagement          See flowsheet documentation for full assessment, interventions and recommendations. Outcome: Progressing  Note: Limitation: Decreased ADL status, Decreased UE ROM, Decreased UE strength, Decreased Safe judgement during ADL, Decreased cognition, Decreased endurance, Decreased self-care trans, Decreased high-level ADLs  Prognosis: Fair  Assessment: Pt is a 66 y.o. male who presented to [facility] on 11/2/2023 with distended abdomen. Pt with diagnosis of decompensated hepatic cirrhosis. Pt s/p IR paracentesis on 11/3. Pt with PMHx of anemia, ascites and constipation. Pt greeted bedside for OT evaluation on 11/4/2023. Pt resides with mother and brother in a 3SH with 0 step and elevator access. PTA, Pt reports being I with ADLs/IADLs/. Pt reports being I with RW, however, recently purchased a W/C. Pt demonstrating the following occupational deficits: max A with UB ADLs, total assist with LB ADLs, min-mod A with Rolling in bed, and mod Ax2 with bed mobility. Limitations that impact functional performance include decreased ADL status, decreased UE ROM, decreased UE strength, decreased safe judgement during ADLs, decreased cognition, decreased endurance, decreased sensation, decreased self care transfers, decreased high level ADLs, and pain.  Occupational performance areas to address ADL retraining, functional transfer training, UE strengthening/ROM, endurance training, cognitive reorientation, Pt/caregiver education, equipment evaluation/education, compensatory technique education, energy conservation, and activity engagement . Pt would benefit from continued skilled OT services while in hospital to maximize independence with ADLs. Will continue to follow Pt's progress. Pt would benefit from post acute rehabilitation services upon DC to maximize safety and independence with ADLs and functional tasks of choice.      Rehab Resource Intensity Level, OT: I (Maximum Resource Intensity)

## 2023-11-04 NOTE — PLAN OF CARE
Problem: PHYSICAL THERAPY ADULT  Goal: Performs mobility at highest level of function for planned discharge setting. See evaluation for individualized goals. Description: Treatment/Interventions: Functional transfer training, Therapeutic exercise, Patient/family training, Bed mobility, OT  Equipment Recommended:  (TBD)       See flowsheet documentation for full assessment, interventions and recommendations. Note: Prognosis: Fair  Problem List: Decreased strength, Decreased endurance, Decreased mobility, Pain  Assessment: Pt is a 66 y.o. male seen for PT evaluation s/p admit to [address] [place] [address] on 11/2/2023. Pt was admitted with a primary dx of: Decompensated hepatic cirrhosis, Constipation , Ascites, Anemia. s/p paracentesis during this admission. PT now consulted for assessment of mobility and d/c needs. Pt with Up and OOB as tolerated  orders. Pts current clinical presentation is Unstable/ Unpredictable (high complexity) due to Ongoing medical management for primary dx, Decreased activity tolerance compared to baseline, Fall risk, Increased assistance needed from caregiver at current time. Prior to admission, pt was Ind for mobility and ADLs. Pt lives with mother and brother in [facility], bed/bath on 2nd floor , but has elevator access inside house. Diana Plummer Upon evaluation, pt currently is requiring Assist X 2 for bed mobility . Pt limited at this time 2* abdominal distention, inability to tolerate sitting upright , abdominal discomfort, SOB, fatigue. PT to see for further transfers and ambulation assessment as appropriate. Pt presents at PT eval functioning below baseline and currently w/ overall mobility deficits 2* to: impaired balance, decreased endurance, pain, decreased activity tolerance compared to baseline, decreased functional mobility tolerance compared to baseline, fall risk. Pt currently at a fall risk 2* to impairments listed above.   Pt will continue to benefit from skilled acute PT interventions to address stated impairments; to maximize functional mobility; for ongoing pt/ family training; and DME needs. At conclusion of PT session pt returned BTB, bed alarm engaged, b/l side rails up, all needs in reach, and RN notified of session findings/recommendations with phone and call bell within reach. Pt denies any further questions at this time. The patient's AM-PAC Basic Mobility Inpatient Short Form Raw Score is 8. A Raw score of less than or equal to 16 suggests the patient may benefit from discharge to post-acute rehabilitation services. Please also refer to the recommendation of the Physical Therapist for safe discharge planning. Recommend Rehab upon hospital D/C. Barriers to Discharge: Decreased caregiver support     Rehab Resource Intensity Level, PT: I (Maximum Resource Intensity)    See flowsheet documentation for full assessment.

## 2023-11-04 NOTE — NURSING NOTE
Asked permission to recheck blood pressure, patient hesitant but ended up allowing me to check. BP was 79/42 MAP 54. SODC made aware. Patient still refusing albumin after stressing the importance to increase his blood pressure. SODC at bedside.

## 2023-11-05 LAB
ALBUMIN SERPL BCP-MCNC: 2.5 G/DL (ref 3.5–5)
ALP SERPL-CCNC: 82 U/L (ref 34–104)
ALT SERPL W P-5'-P-CCNC: 4 U/L (ref 7–52)
ANION GAP SERPL CALCULATED.3IONS-SCNC: 3 MMOL/L
ANISOCYTOSIS BLD QL SMEAR: PRESENT
AST SERPL W P-5'-P-CCNC: 33 U/L (ref 13–39)
BASOPHILS # BLD MANUAL: 0.15 THOUSAND/UL (ref 0–0.1)
BASOPHILS NFR MAR MANUAL: 2 % (ref 0–1)
BILIRUB SERPL-MCNC: 1.84 MG/DL (ref 0.2–1)
BUN SERPL-MCNC: 8 MG/DL (ref 5–25)
CALCIUM ALBUM COR SERPL-MCNC: 9.3 MG/DL (ref 8.3–10.1)
CALCIUM SERPL-MCNC: 8.1 MG/DL (ref 8.4–10.2)
CHLORIDE SERPL-SCNC: 99 MMOL/L (ref 96–108)
CO2 SERPL-SCNC: 33 MMOL/L (ref 21–32)
CREAT SERPL-MCNC: 0.67 MG/DL (ref 0.6–1.3)
EOSINOPHIL # BLD MANUAL: 0.3 THOUSAND/UL (ref 0–0.4)
EOSINOPHIL NFR BLD MANUAL: 4 % (ref 0–6)
ERYTHROCYTE [DISTWIDTH] IN BLOOD BY AUTOMATED COUNT: 14.8 % (ref 11.6–15.1)
GFR SERPL CREATININE-BSD FRML MDRD: 107 ML/MIN/1.73SQ M
GLUCOSE SERPL-MCNC: 95 MG/DL (ref 65–140)
HCT VFR BLD AUTO: 30.5 % (ref 36.5–49.3)
HGB BLD-MCNC: 9.5 G/DL (ref 12–17)
LYMPHOCYTES # BLD AUTO: 0.75 THOUSAND/UL (ref 0.6–4.47)
LYMPHOCYTES # BLD AUTO: 6 % (ref 14–44)
MACROCYTES BLD QL AUTO: PRESENT
MAGNESIUM SERPL-MCNC: 1.9 MG/DL (ref 1.9–2.7)
MCH RBC QN AUTO: 32.9 PG (ref 26.8–34.3)
MCHC RBC AUTO-ENTMCNC: 31.1 G/DL (ref 31.4–37.4)
MCV RBC AUTO: 106 FL (ref 82–98)
MONOCYTES # BLD AUTO: 0.9 THOUSAND/UL (ref 0–1.22)
MONOCYTES NFR BLD: 12 % (ref 4–12)
NEUTROPHILS # BLD MANUAL: 5.41 THOUSAND/UL (ref 1.85–7.62)
NEUTS SEG NFR BLD AUTO: 72 % (ref 43–75)
PHOSPHATE SERPL-MCNC: 3.2 MG/DL (ref 2.7–4.5)
PLATELET # BLD AUTO: 171 THOUSANDS/UL (ref 149–390)
PLATELET BLD QL SMEAR: ADEQUATE
PMV BLD AUTO: 8.6 FL (ref 8.9–12.7)
POIKILOCYTOSIS BLD QL SMEAR: PRESENT
POTASSIUM SERPL-SCNC: 3.8 MMOL/L (ref 3.5–5.3)
PROT SERPL-MCNC: 6.4 G/DL (ref 6.4–8.4)
RBC # BLD AUTO: 2.89 MILLION/UL (ref 3.88–5.62)
RBC MORPH BLD: PRESENT
SODIUM SERPL-SCNC: 135 MMOL/L (ref 135–147)
VARIANT LYMPHS # BLD AUTO: 4 %
WBC # BLD AUTO: 7.51 THOUSAND/UL (ref 4.31–10.16)

## 2023-11-05 PROCEDURE — 83735 ASSAY OF MAGNESIUM: CPT

## 2023-11-05 PROCEDURE — 99232 SBSQ HOSP IP/OBS MODERATE 35: CPT | Performed by: INTERNAL MEDICINE

## 2023-11-05 PROCEDURE — 85007 BL SMEAR W/DIFF WBC COUNT: CPT

## 2023-11-05 PROCEDURE — 80053 COMPREHEN METABOLIC PANEL: CPT

## 2023-11-05 PROCEDURE — 85027 COMPLETE CBC AUTOMATED: CPT

## 2023-11-05 PROCEDURE — 84100 ASSAY OF PHOSPHORUS: CPT

## 2023-11-05 RX ORDER — SPIRONOLACTONE 50 MG/1
50 TABLET, FILM COATED ORAL DAILY
Status: DISCONTINUED | OUTPATIENT
Start: 2023-11-06 | End: 2023-11-12 | Stop reason: HOSPADM

## 2023-11-05 RX ORDER — KETOROLAC TROMETHAMINE 30 MG/ML
15 INJECTION, SOLUTION INTRAMUSCULAR; INTRAVENOUS ONCE
Status: DISCONTINUED | OUTPATIENT
Start: 2023-11-06 | End: 2023-11-06

## 2023-11-05 RX ORDER — FUROSEMIDE 40 MG/1
40 TABLET ORAL DAILY
Status: DISCONTINUED | OUTPATIENT
Start: 2023-11-05 | End: 2023-11-05

## 2023-11-05 RX ORDER — LACTULOSE 20 G/30ML
20 SOLUTION ORAL DAILY
Status: DISCONTINUED | OUTPATIENT
Start: 2023-11-05 | End: 2023-11-12 | Stop reason: HOSPADM

## 2023-11-05 RX ORDER — SPIRONOLACTONE 50 MG/1
100 TABLET, FILM COATED ORAL DAILY
Status: DISCONTINUED | OUTPATIENT
Start: 2023-11-05 | End: 2023-11-05

## 2023-11-05 NOTE — PLAN OF CARE
Problem: Prexisting or High Potential for Compromised Skin Integrity  Goal: Skin integrity is maintained or improved  Description: INTERVENTIONS:  - Identify patients at risk for skin breakdown  - Assess and monitor skin integrity  - Assess and monitor nutrition and hydration status  - Monitor labs   - Assess for incontinence   - Turn and reposition patient  - Assist with mobility/ambulation  - Relieve pressure over bony prominences  - Avoid friction and shearing  - Provide appropriate hygiene as needed including keeping skin clean and dry  - Evaluate need for skin moisturizer/barrier cream  - Collaborate with interdisciplinary team   - Patient/family teaching  - Consider wound care consult   Outcome: Progressing     Problem: MOBILITY - ADULT  Goal: Maintain or return to baseline ADL function  Description: INTERVENTIONS:  -  Assess patient's ability to carry out ADLs; assess patient's baseline for ADL function and identify physical deficits which impact ability to perform ADLs (bathing, care of mouth/teeth, toileting, grooming, dressing, etc.)  - Assess/evaluate cause of self-care deficits   - Assess range of motion  - Assess patient's mobility; develop plan if impaired  - Assess patient's need for assistive devices and provide as appropriate  - Encourage maximum independence but intervene and supervise when necessary  - Involve family in performance of ADLs  - Assess for home care needs following discharge   - Consider OT consult to assist with ADL evaluation and planning for discharge  - Provide patient education as appropriate  Outcome: Progressing  Goal: Maintains/Returns to pre admission functional level  Description: INTERVENTIONS:  - Perform BMAT or MOVE assessment daily.   - Set and communicate daily mobility goal to care team and patient/family/caregiver. - Collaborate with rehabilitation services on mobility goals if consulted  - Perform Range of Motion  times a day.   - Reposition patient every hours.  - Dangle patient  times a day  - Stand patient  times a day  - Ambulate patient  times a day  - Out of bed to chair  times a day   - Out of bed for meals times a day  - Out of bed for toileting  - Record patient progress and toleration of activity level   Outcome: Progressing     Problem: PAIN - ADULT  Goal: Verbalizes/displays adequate comfort level or baseline comfort level  Description: Interventions:  - Encourage patient to monitor pain and request assistance  - Assess pain using appropriate pain scale  - Administer analgesics based on type and severity of pain and evaluate response  - Implement non-pharmacological measures as appropriate and evaluate response  - Consider cultural and social influences on pain and pain management  - Notify physician/advanced practitioner if interventions unsuccessful or patient reports new pain  Outcome: Progressing     Problem: INFECTION - ADULT  Goal: Absence or prevention of progression during hospitalization  Description: INTERVENTIONS:  - Assess and monitor for signs and symptoms of infection  - Monitor lab/diagnostic results  - Monitor all insertion sites, i.e. indwelling lines, tubes, and drains  - Monitor endotracheal if appropriate and nasal secretions for changes in amount and color  - Oscar appropriate cooling/warming therapies per order  - Administer medications as ordered  - Instruct and encourage patient and family to use good hand hygiene technique  - Identify and instruct in appropriate isolation precautions for identified infection/condition  Outcome: Progressing  Goal: Absence of fever/infection during neutropenic period  Description: INTERVENTIONS:  - Monitor WBC  Outcome: Progressing     Problem: SAFETY ADULT  Goal: Maintain or return to baseline ADL function  Description: INTERVENTIONS:  -  Assess patient's ability to carry out ADLs; assess patient's baseline for ADL function and identify physical deficits which impact ability to perform ADLs (bathing, care of mouth/teeth, toileting, grooming, dressing, etc.)  - Assess/evaluate cause of self-care deficits   - Assess range of motion  - Assess patient's mobility; develop plan if impaired  - Assess patient's need for assistive devices and provide as appropriate  - Encourage maximum independence but intervene and supervise when necessary  - Involve family in performance of ADLs  - Assess for home care needs following discharge   - Consider OT consult to assist with ADL evaluation and planning for discharge  - Provide patient education as appropriate  Outcome: Progressing  Goal: Maintains/Returns to pre admission functional level  Description: INTERVENTIONS:  - Perform BMAT or MOVE assessment daily.   - Set and communicate daily mobility goal to care team and patient/family/caregiver. - Collaborate with rehabilitation services on mobility goals if consulted  - Perform Range of Motion    times a day. - Reposition patient every      hours.   - Dangle patient    times a day  - Stand patient      times a day  - Ambulate patient    times a day  - Out of bed to chair    times a day   - Out of bed for meals      times a day  - Out of bed for toileting  - Record patient progress and toleration of activity level   Outcome: Progressing  Goal: Patient will remain free of falls  Description: INTERVENTIONS:  - Educate patient/family on patient safety including physical limitations  - Instruct patient to call for assistance with activity   - Consult OT/PT to assist with strengthening/mobility   - Keep Call bell within reach  - Keep bed low and locked with side rails adjusted as appropriate  - Keep care items and personal belongings within reach  - Initiate and maintain comfort rounds  - Make Fall Risk Sign visible to staff  - Offer Toileting every  Hours, in advance of need  - Initiate/Maintain alarm  - Obtain necessary fall risk management equipment:   - Apply yellow socks and bracelet for high fall risk patients  - Consider moving patient to room near nurses station  Outcome: Progressing     Problem: DISCHARGE PLANNING  Goal: Discharge to home or other facility with appropriate resources  Description: INTERVENTIONS:  - Identify barriers to discharge w/patient and caregiver  - Arrange for needed discharge resources and transportation as appropriate  - Identify discharge learning needs (meds, wound care, etc.)  - Arrange for interpretive services to assist at discharge as needed  - Refer to Case Management Department for coordinating discharge planning if the patient needs post-hospital services based on physician/advanced practitioner order or complex needs related to functional status, cognitive ability, or social support system  Outcome: Progressing     Problem: Knowledge Deficit  Goal: Patient/family/caregiver demonstrates understanding of disease process, treatment plan, medications, and discharge instructions  Description: Complete learning assessment and assess knowledge base.   Interventions:  - Provide teaching at level of understanding  - Provide teaching via preferred learning methods  Outcome: Progressing

## 2023-11-05 NOTE — PLAN OF CARE
Problem: MOBILITY - ADULT  Goal: Maintain or return to baseline ADL function  Description: INTERVENTIONS:  -  Assess patient's ability to carry out ADLs; assess patient's baseline for ADL function and identify physical deficits which impact ability to perform ADLs (bathing, care of mouth/teeth, toileting, grooming, dressing, etc.)  - Assess/evaluate cause of self-care deficits   - Assess range of motion  - Assess patient's mobility; develop plan if impaired  - Assess patient's need for assistive devices and provide as appropriate  - Encourage maximum independence but intervene and supervise when necessary  - Involve family in performance of ADLs  - Assess for home care needs following discharge   - Consider OT consult to assist with ADL evaluation and planning for discharge  - Provide patient education as appropriate  Outcome: Progressing     Problem: Prexisting or High Potential for Compromised Skin Integrity  Goal: Skin integrity is maintained or improved  Description: INTERVENTIONS:  - Identify patients at risk for skin breakdown  - Assess and monitor skin integrity  - Assess and monitor nutrition and hydration status  - Monitor labs   - Assess for incontinence   - Turn and reposition patient  - Assist with mobility/ambulation  - Relieve pressure over bony prominences  - Avoid friction and shearing  - Provide appropriate hygiene as needed including keeping skin clean and dry  - Evaluate need for skin moisturizer/barrier cream  - Collaborate with interdisciplinary team   - Patient/family teaching  - Consider wound care consult   Outcome: Progressing

## 2023-11-05 NOTE — PROGRESS NOTES
INTERNAL MEDICINE RESIDENCY PROGRESS NOTE     Name: Tan Johnson   Age & Sex: 66 y.o. male   MRN: [id number]  Unit/Bed#: -[facility]   Encounter: [id number]  Team: SOD Team C     PATIENT INFORMATION     Name: Tan Johnson   Age & Sex: 66 y.o. male   MRN: [id number]  Hospital Stay Days: 3    ASSESSMENT/PLAN     Principal Problem:    Decompensated hepatic cirrhosis ([address])  Active Problems:    Other ascites    Constipation    Anemia      * Decompensated hepatic cirrhosis ([address])  Assessment & Plan  MELD 3.0: 17 at 11/4/2023  3:57 AM  MELD-Na: 17 at 11/4/2023  3:57 AM  Calculated from:  Serum Creatinine: 0.70 mg/dL (Using min of 1 mg/dL) at 11/4/2023  3:57 AM  Serum Sodium: 134 mmol/L at 11/4/2023  3:57 AM  Total Bilirubin: 2.27 mg/dL at 11/4/2023  3:57 AM  Serum Albumin: 2.7 g/dL at 11/4/2023  3:57 AM  INR(ratio): 1.54 at 11/2/2023 10:02 AM  Age at listing (hypothetical): 66 years  Sex: Male at 11/4/2023  3:57 AM     In the past, patient's lab work up was suggestive of type 1 autoimmune hepatitis with elevated IgG, JOSE E, and anti-smooth muscle antibody. Patient has not had paracentesis since May 2023 and has not followed up with outpatient. Patient presently admitted with cirrhosis decompensated by marked ascites without evidence of encephalopathy. S/p paracentesis on 11/3 with removal of 19 L. Partially repleted with albumin, although patient refused full dose. SAAG<1.1, fluid without bacterial growth to date.      Plan:  -Will try to coordinate liver biopsy to assist with diagnosis once paracentesis completed; would also benefit from dedicated imaging to evaluate for [address]  -Thiamine, folic acid started  -GI consulted, appreciate recommendations   -will start 5:2 ratio of aldactone/lasix today as well as lactulose given asterixis on exam - monitor outputs  -Sodium and fluid restricted diet, ensure protein supplements  -Will likely need serial paracenteses considering fluid build up in abdomen  -given overall poor insight into condition and reluctance for appropriate therapy, will obtain neuropsych eval    Other ascites  Assessment & Plan  See plan for "decompensated hepatic cirrhosis"    Anemia  Assessment & Plan  Patient found to be anemic on presentation - hemoglobin today improved to 9.5 following paracentesis. Previous iron panels notable for low normal iron, markedly low transferrin, elevated ferritin. No overt signs of bleeding at this time. Plan  Continue to monitor, especially following further paracenteses  Will consider repeat iron studies in the future    Constipation  Assessment & Plan  Patient endorsing constipation on admission, likely in setting of profound abdominal distention and ascites. He continues to endorse ongoing constipation even following paracentesis. Patient previously on lactulose but has not been on this since admission as he did not appear overtly encephalopathic. Of note, patient now with asterixis on exam this morning. Plan:  -Given concern for asterixis, will resume lactulose therapy today; titrate to 3-4 bowel movements daily  -Miralax/Dulcolax otherwise          Disposition: remain admitted pending further inpatient care as well as coordination of follow-up/neuropsych evaluation     SUBJECTIVE     Patient seen and examined. No acute events overnight, though patient noted to have improved pressures throughout the day yesterday despite refusing full albumin supplementation. This morning, patient reports some improvement in his abdominal pain, though he remains markedly distended. He is adamant that he not receive further albumin or fluid treatment as "it's stupid to be putting more fluid back into me." He is also adamant that dandelion seed tea is a more effective diuretic than any medical therapy we could offer. Following an extensive discussion, the patient expressed that he would be willing to undergo a trial of diuretic therapy.      OBJECTIVE     Vitals:    11/04/23 2000 11/04/23 2100 23 0600 23 0720   BP:  102/56  101/57   BP Location:       Pulse:    96   Resp:       Temp:  98 °F (36.7 °C)  98.6 °F (37 °C)   TempSrc:       SpO2: (!) 89%   100%   Weight:   127 kg (280 lb 3.3 oz)       Temperature:   Temp (24hrs), Av.2 °F (36.8 °C), Min:98 °F (36.7 °C), Max:98.6 °F (37 °C)    Temperature: 98.6 °F (37 °C)  Intake & Output:  I/O          07 07    P. O.  240    Total Intake(mL/kg)  240 (1.9)    Urine (mL/kg/hr) 100 (0) 175 (0.1)    Other 61218     Total Output 53949 175    Net -55485 +65          Unmeasured Urine Occurrence 1 x           Weights: There is no height or weight on file to calculate BMI. Weight (last 2 days)       Date/Time Weight    23 0600 127 (280.2)    23 0600 148 (325.84)    23 0340 148 (325.84)          Physical Exam  Vitals and nursing note reviewed. Constitutional:       General: He is not in acute distress. Appearance: He is not ill-appearing. Comments: Disheveled-appearing   HENT:      Head: Normocephalic and atraumatic. Right Ear: External ear normal.      Left Ear: External ear normal.      Nose: Nose normal.      Mouth/Throat:      Mouth: Mucous membranes are dry. Pharynx: Oropharynx is clear. Eyes:      Extraocular Movements: Extraocular movements intact. Conjunctiva/sclera: Conjunctivae normal.      Pupils: Pupils are equal, round, and reactive to light. Cardiovascular:      Rate and Rhythm: Normal rate and regular rhythm. Heart sounds: Normal heart sounds. No murmur heard. Pulmonary:      Effort: No respiratory distress. Breath sounds: Normal breath sounds. Abdominal:      General: Bowel sounds are normal. There is distension. Tenderness: There is no abdominal tenderness.       Comments: +fluid wave; tense, distended abdomen though somewhat improved from prior days; paracentesis site dressed with bandage   Musculoskeletal:      Cervical back: Normal range of motion. Right lower leg: Edema present. Left lower leg: Edema present. Skin:     General: Skin is warm and dry. Capillary Refill: Capillary refill takes less than 2 seconds. Neurological:      Mental Status: He is alert. Mental status is at baseline. Comments: Alert, but rubin not appear to fully appreciate the seriousness of his condition; +mild bilateral asterixis   Psychiatric:         Speech: Speech is tangential.       LABORATORY DATA     Labs: I have personally reviewed pertinent reports. Results from last 7 days   Lab Units 11/05/23 0525 11/04/23 0357 11/04/23  0153 11/03/23 0511 11/02/23  0937   WBC Thousand/uL 7.51 4.59  --  5.48 6.92   HEMOGLOBIN g/dL 9.5* 8.1* 7.8* 8.9* 10.3*   HEMATOCRIT % 30.5* 25.4* 23.8* 27.7* 32.2*   PLATELETS Thousands/uL 171 141*  --  166 233   NEUTROS PCT %  --  55  --  62 69   MONOS PCT %  --  16*  --  17* 14*   MONO PCT % 12  --   --   --   --    EOS PCT % 4 4  --  3 3      Results from last 7 days   Lab Units 11/05/23 0525 11/04/23 0357 11/03/23  2158 11/03/23  0511   POTASSIUM mmol/L 3.8 3.4* 3.7 3.8   CHLORIDE mmol/L 99 97 99 99   CO2 mmol/L 33* 30 31 28   BUN mg/dL 8 10 10 9   CREATININE mg/dL 0.67 0.70 0.75 0.64   CALCIUM mg/dL 8.1* 8.1* 8.1* 7.9*   ALK PHOS U/L 82 64  --  75   ALT U/L 4* <3*  --  <3*   AST U/L 33 26  --  25     Results from last 7 days   Lab Units 11/05/23 0525 11/04/23 0357 11/03/23  0511   MAGNESIUM mg/dL 1.9 1.9 1.7*     Results from last 7 days   Lab Units 11/05/23 0525 11/03/23  0511   PHOSPHORUS mg/dL 3.2 3.4      Results from last 7 days   Lab Units 11/02/23  1002   INR  1.54*   PTT seconds 45*               IMAGING & DIAGNOSTIC TESTING     Radiology Results: I have personally reviewed pertinent reports. US abdomen limited    Result Date: 11/2/2023  Impression: Ascites visible in all 4 quadrants.  Workstation performed: [id number]     Other Diagnostic Testing: I have personally reviewed pertinent reports. ACTIVE MEDICATIONS     Current Facility-Administered Medications   Medication Dose Route Frequency    albumin human (FLEXBUMIN) 5 % injection 25 g  25 g Intravenous Once    bisacodyl (DULCOLAX) rectal suppository 10 mg  10 mg Rectal Daily PRN    enoxaparin (LOVENOX) subcutaneous injection 40 mg  40 mg Subcutaneous Daily    folic acid (FOLVITE) tablet 1 mg  1 mg Oral Daily    furosemide (LASIX) tablet 40 mg  40 mg Oral Daily    lactulose oral solution 20 g  20 g Oral Daily    melatonin tablet 3 mg  3 mg Oral HS    ondansetron (ZOFRAN) injection 4 mg  4 mg Intravenous Q6H PRN    polyethylene glycol (MIRALAX) packet 17 g  17 g Oral Daily    potassium chloride (K-DUR,KLOR-CON) CR tablet 40 mEq  40 mEq Oral Once    spironolactone (ALDACTONE) tablet 100 mg  100 mg Oral Daily    thiamine tablet 100 mg  100 mg Oral Daily       VTE Pharmacologic Prophylaxis: Enoxaparin (Lovenox)  VTE Mechanical Prophylaxis: sequential compression device    Portions of the record may have been created with voice recognition software. Occasional wrong word or "sound a like" substitutions may have occurred due to the inherent limitations of voice recognition software. Read the chart carefully and recognize, using context, where substitutions have occurred.   ==  Todd Hayes MD  [address] [facility]  Internal Medicine Residency PGY-1

## 2023-11-06 ENCOUNTER — APPOINTMENT (INPATIENT)
Dept: RADIOLOGY | Facility: HOSPITAL | Age: 58
DRG: 283 | End: 2023-11-06
Payer: MEDICARE

## 2023-11-06 ENCOUNTER — PATIENT OUTREACH (OUTPATIENT)
Age: 58
End: 2023-11-06

## 2023-11-06 ENCOUNTER — TELEPHONE (OUTPATIENT)
Dept: INTERNAL MEDICINE CLINIC | Facility: OTHER | Age: 58
End: 2023-11-06

## 2023-11-06 LAB
ANION GAP SERPL CALCULATED.3IONS-SCNC: 2 MMOL/L
BACTERIA SPEC BFLD CULT: NO GROWTH
BUN SERPL-MCNC: 8 MG/DL (ref 5–25)
CALCIUM SERPL-MCNC: 7.9 MG/DL (ref 8.4–10.2)
CHLORIDE SERPL-SCNC: 101 MMOL/L (ref 96–108)
CO2 SERPL-SCNC: 34 MMOL/L (ref 21–32)
CREAT SERPL-MCNC: 0.65 MG/DL (ref 0.6–1.3)
ERYTHROCYTE [DISTWIDTH] IN BLOOD BY AUTOMATED COUNT: 14.6 % (ref 11.6–15.1)
GFR SERPL CREATININE-BSD FRML MDRD: 108 ML/MIN/1.73SQ M
GLUCOSE SERPL-MCNC: 113 MG/DL (ref 65–140)
GRAM STN SPEC: NORMAL
HCT VFR BLD AUTO: 29.8 % (ref 36.5–49.3)
HGB BLD-MCNC: 9.2 G/DL (ref 12–17)
MCH RBC QN AUTO: 33 PG (ref 26.8–34.3)
MCHC RBC AUTO-ENTMCNC: 30.9 G/DL (ref 31.4–37.4)
MCV RBC AUTO: 107 FL (ref 82–98)
PLATELET # BLD AUTO: 157 THOUSANDS/UL (ref 149–390)
PMV BLD AUTO: 8.5 FL (ref 8.9–12.7)
POTASSIUM SERPL-SCNC: 4 MMOL/L (ref 3.5–5.3)
RBC # BLD AUTO: 2.79 MILLION/UL (ref 3.88–5.62)
SODIUM SERPL-SCNC: 137 MMOL/L (ref 135–147)
WBC # BLD AUTO: 7.58 THOUSAND/UL (ref 4.31–10.16)

## 2023-11-06 PROCEDURE — 0W9G3ZZ DRAINAGE OF PERITONEAL CAVITY, PERCUTANEOUS APPROACH: ICD-10-PCS | Performed by: INTERNAL MEDICINE

## 2023-11-06 PROCEDURE — 99232 SBSQ HOSP IP/OBS MODERATE 35: CPT | Performed by: INTERNAL MEDICINE

## 2023-11-06 PROCEDURE — 49083 ABD PARACENTESIS W/IMAGING: CPT | Performed by: NURSE PRACTITIONER

## 2023-11-06 PROCEDURE — 49083 ABD PARACENTESIS W/IMAGING: CPT

## 2023-11-06 PROCEDURE — 85027 COMPLETE CBC AUTOMATED: CPT | Performed by: INTERNAL MEDICINE

## 2023-11-06 PROCEDURE — 80048 BASIC METABOLIC PNL TOTAL CA: CPT | Performed by: INTERNAL MEDICINE

## 2023-11-06 RX ORDER — LIDOCAINE HYDROCHLORIDE 10 MG/ML
INJECTION, SOLUTION EPIDURAL; INFILTRATION; INTRACAUDAL; PERINEURAL AS NEEDED
Status: COMPLETED | OUTPATIENT
Start: 2023-11-06 | End: 2023-11-06

## 2023-11-06 RX ORDER — ALBUMIN (HUMAN) 12.5 G/50ML
12.5 SOLUTION INTRAVENOUS ONCE
Status: CANCELLED | OUTPATIENT
Start: 2023-11-06 | End: 2023-11-06

## 2023-11-06 RX ORDER — KETOROLAC TROMETHAMINE 30 MG/ML
15 INJECTION, SOLUTION INTRAMUSCULAR; INTRAVENOUS ONCE
Status: COMPLETED | OUTPATIENT
Start: 2023-11-06 | End: 2023-11-06

## 2023-11-06 RX ADMIN — LIDOCAINE HYDROCHLORIDE 10 ML: 10 INJECTION, SOLUTION EPIDURAL; INFILTRATION; INTRACAUDAL; PERINEURAL at 15:46

## 2023-11-06 RX ADMIN — KETOROLAC TROMETHAMINE 15 MG: 30 INJECTION, SOLUTION INTRAMUSCULAR; INTRAVENOUS at 00:52

## 2023-11-06 NOTE — PROGRESS NOTES
Pastoral Care Progress Note    2023  Patient: Rebeca Perdomo : 1965  Admission Date & Time: 2023 8856  MRN: [id number] CSN: [id number]         prayed silently for the pt as he slept.  remains available.

## 2023-11-06 NOTE — CONSULTS
Consultation - Neuropsychology/Psychology Department  Sunil Sherwood 66 y.o. male MRN: [id number]  Unit/Bed#: -01 Encounter: [id number]        Reason for Consultation:  Sunil Sherwood is a 66y.o. year old male who was referred for a Neuropsychological Exam to assess cognitive functioning and comment on capacity to make informed medical decisions. History of Present Illness  Decompensated Hepatis Cirrhosis    Physician Requesting Consult: Yousif Galo*    PROBLEM LIST:  Patient Active Problem List   Diagnosis    Class 2 obesity in adult    Decompensated hepatic cirrhosis (HCC)    Constipation    Other ascites    Anemia         Historical Information   History reviewed. No pertinent past medical history. History reviewed. No pertinent surgical history. Social History   Social History     Substance and Sexual Activity   Alcohol Use Not Currently     Social History     Substance and Sexual Activity   Drug Use Never     Social History     Tobacco Use   Smoking Status Never    Passive exposure: Past   Smokeless Tobacco Never     Family History: History reviewed. No pertinent family history.     Meds/Allergies   current meds:   Current Facility-Administered Medications   Medication Dose Route Frequency    albumin human (FLEXBUMIN) 5 % injection 25 g  25 g Intravenous Once    bisacodyl (DULCOLAX) rectal suppository 10 mg  10 mg Rectal Daily PRN    enoxaparin (LOVENOX) subcutaneous injection 40 mg  40 mg Subcutaneous Daily    folic acid (FOLVITE) tablet 1 mg  1 mg Oral Daily    lactulose oral solution 20 g  20 g Oral Daily    melatonin tablet 3 mg  3 mg Oral HS    ondansetron (ZOFRAN) injection 4 mg  4 mg Intravenous Q6H PRN    polyethylene glycol (MIRALAX) packet 17 g  17 g Oral Daily    potassium chloride (K-DUR,KLOR-CON) CR tablet 40 mEq  40 mEq Oral Once    spironolactone (ALDACTONE) tablet 50 mg  50 mg Oral Daily    thiamine tablet 100 mg  100 mg Oral Daily       No Known Allergies      Family and Social Support:   Living Arrangements: Lives w/ Extended Family (mother and brother)  Support Systems: Self; Family members  Type of Current Residence: Apartment  Discharge planning discussed with[de-identified] patient  Freedom of Choice: Yes      Behavioral Observations: Patient was alert, oriented except for season of year and day/week and name of hospital; affect appeared appropriate to content and patient denied depressed mood and anxiety; patient reported he was hospitalized for constipation, admitted to a medical history of constipation and states he does not know what he is being treated for in hospital. He stated, "I just want to be treated for something so I know what I have". Cognitive Examination    General Cognitive Functioning MMSE = [id number]; Attention/Concentration Auditory Selective Attention = Average; Auditory Vigilance = Within Normal Limits; Information Processing Speed = Within Normal Limits    Frontal Systems/Executive Functioning Mental Flexibility/Cognitive Control = Within Normal Limits; Working Memory = Impaired Abstract Reasoning = Impaired;  Generative Ability = Impaired, S    Language Functioning Confrontation naming = Within Normal Limits, Phonemic Fluency = Impaired; Semantic Retrieval = Impaired; Comprehension of Complex Ideational Material = Impaired; Vocabulary Knowledge = Within Normal Limits; Praxis = Within Normal Limits; Repetition = Within Normal Limits; Basic Reading = Within Normal Limits; Following Commands = Impaired    Memory Functioning Narrative Recall - Short Delay = Impaired; Long Delay Narrative Recall = Impaired;  Three word recall = Impaired    Visuo-Spatial Abilities Not Assessed    Functional Knowledge  Health & Safety Knowledge = Impaired;     Summary/Impression:  Results of Neuropsychological EXam revealed diffuse cognitive dysfunction and on a measure assessing awareness of personal health status and ability to evaluate health problems, handle medical emergencies and take safety precautions, patient performed in the IMPAIRED range of functioning. During this encounter, patient does not appear to have capacity to make fully informed medical decisions.

## 2023-11-06 NOTE — PROGRESS NOTES
INTERNAL MEDICINE RESIDENCY PROGRESS NOTE     Name: Michael Haines   Age & Sex: 66 y.o. male   MRN: [id number]  Unit/Bed#: -01   Encounter: [id number]  Team: SOD Team C     PATIENT INFORMATION     Name: Michael Haines   Age & Sex: 66 y.o. male   MRN: [id number]  Hospital Stay Days: 4    ASSESSMENT/PLAN     Principal Problem:    Decompensated hepatic cirrhosis ([address])  Active Problems:    Constipation    Other ascites    Anemia      Anemia  Assessment & Plan  Patient found to be anemic on presentation - hemoglobin improved to 9.5 following paracentesis. Previous iron panels notable for low normal iron, markedly low transferrin, elevated ferritin. No overt signs of bleeding at this time. Plan  Continue to monitor, especially following further paracenteses  Will consider repeat iron studies in the future    Other ascites  Assessment & Plan  See plan for "decompensated hepatic cirrhosis"    Constipation  Assessment & Plan  Patient endorsing constipation on admission, likely in setting of profound abdominal distention and ascites. He continues to endorse ongoing constipation even following paracentesis. Patient previously on lactulose. Of note, patient now with asterixis on exam this morning. Plan:  Continue lactulose therapy today; titrate to 3-4 bowel movements daily  Miralax/Dulcolax otherwise      * Decompensated hepatic cirrhosis (HCC)  Assessment & Plan  MELD 3.0: 17 at 11/4/2023  3:57 AM  MELD-Na: 17 at 11/4/2023  3:57 AM  Calculated from:  Serum Creatinine: 0.70 mg/dL (Using min of 1 mg/dL) at 11/4/2023  3:57 AM  Serum Sodium: 134 mmol/L at 11/4/2023  3:57 AM  Total Bilirubin: 2.27 mg/dL at 11/4/2023  3:57 AM  Serum Albumin: 2.7 g/dL at 11/4/2023  3:57 AM  INR(ratio): 1.54 at 11/2/2023 10:02 AM  Age at listing (hypothetical): 66 years  Sex: Male at 11/4/2023  3:57 AM     Patient currently presenting with decompensated cirrhosis with marked ascites and encephalopathy.     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 Patient has not had paracentesis since May 2023 and has not followed up with outpatient. S/p paracentesis on 11/3 with removal of 19 L. Partially repleted with albumin, although patient refused full dose. SAAG<1.1, fluid without bacterial growth as of . Patient presents with encephalopathy . Not mentating at baseline. Asterixis on exam. Reduced communication and responsiveness during examination. Considering liver biopsy in the future, also need to evaluate for [address] Plan:  Per IR paracentesis this afternoon  Liver biopsy once paracentesis completed; dedicated imaging to evaluate for [address]  Continue thiamine, folic acid   GI consulted, appreciate recs   Continue 5:2 ratio of aldactone/lasix today as well as lactulose. Monitor outputs  Sodium and fluid restricted diet, ensure protein supplements  Consider serial paracenteses due to rapid fluid build up in abdomen  Given overall poor insight into condition and reluctance for appropriate therapy, will obtain neuropsych eval        Disposition: inpatient; med/surg     SUBJECTIVE     Patient seen and examined. No acute events overnight. Patient is still waxing/waning, not entirely coherent and pending neuropsychiatric evaluation. Due for a paracentesis with IR later today. OBJECTIVE     Vitals:    23 1504 23 2142 23 2348 23 0822   BP: 100/60 99/60 93/59 104/64   Pulse: 91 85 92 82   Resp:       Temp: 98.6 °F (37 °C) 98.5 °F (36.9 °C)  97.5 °F (36.4 °C)   TempSrc:       SpO2: 100% 93% 94% 100%   Weight:          Temperature:   Temp (24hrs), Av.2 °F (36.8 °C), Min:97.5 °F (36.4 °C), Max:98.6 °F (37 °C)    Temperature: 97.5 °F (36.4 °C)  Intake & Output:  I/O             P. O. 240      Total Intake(mL/kg) 240 (1.9)      Urine (mL/kg/hr) 175 (0.1)  100 (0.2)    Other Total Output 175  100    Net +65  -100                 Weights: There is no height or weight on file to calculate BMI. Weight (last 2 days)       Date/Time Weight    11/05/23 0600 127 (280.2)          Physical Exam  LABORATORY DATA     Labs: I have personally reviewed pertinent reports. Results from last 7 days   Lab Units 11/06/23 0145 11/05/23 0525 11/04/23 0357 11/04/23  0153 11/03/23  0511 11/02/23  0937   WBC Thousand/uL 7.58 7.51 4.59  --  5.48 6.92   HEMOGLOBIN g/dL 9.2* 9.5* 8.1*   < > 8.9* 10.3*   HEMATOCRIT % 29.8* 30.5* 25.4*   < > 27.7* 32.2*   PLATELETS Thousands/uL 157 171 141*  --  166 233   NEUTROS PCT %  --   --  55  --  62 69   MONOS PCT %  --   --  16*  --  17* 14*   MONO PCT %  --  12  --   --   --   --    EOS PCT %  --  4 4  --  3 3    < > = values in this interval not displayed. Results from last 7 days   Lab Units 11/06/23 0145 11/05/23 0525 11/04/23 0357 11/03/23  2158 11/03/23  0511   POTASSIUM mmol/L 4.0 3.8 3.4*   < > 3.8   CHLORIDE mmol/L 101 99 97   < > 99   CO2 mmol/L 34* 33* 30   < > 28   BUN mg/dL 8 8 10   < > 9   CREATININE mg/dL 0.65 0.67 0.70   < > 0.64   CALCIUM mg/dL 7.9* 8.1* 8.1*   < > 7.9*   ALK PHOS U/L  --  82 64  --  75   ALT U/L  --  4* <3*  --  <3*   AST U/L  --  33 26  --  25    < > = values in this interval not displayed. Results from last 7 days   Lab Units 11/05/23 0525 11/04/23 0357 11/03/23 0511   MAGNESIUM mg/dL 1.9 1.9 1.7*     Results from last 7 days   Lab Units 11/05/23  0525 11/03/23  0511   PHOSPHORUS mg/dL 3.2 3.4      Results from last 7 days   Lab Units 11/02/23  1002   INR  1.54*   PTT seconds 45*               IMAGING & DIAGNOSTIC TESTING     Radiology Results: I have personally reviewed pertinent reports. US abdomen limited    Result Date: 11/2/2023  Impression: Ascites visible in all 4 quadrants. Workstation performed: [id number]     Other Diagnostic Testing: I have personally reviewed pertinent reports.     ACTIVE MEDICATIONS     Current Facility-Administered Medications   Medication Dose Route Frequency    albumin human (FLEXBUMIN) 5 % injection 25 g  25 g Intravenous Once    bisacodyl (DULCOLAX) rectal suppository 10 mg  10 mg Rectal Daily PRN    enoxaparin (LOVENOX) subcutaneous injection 40 mg  40 mg Subcutaneous Daily    folic acid (FOLVITE) tablet 1 mg  1 mg Oral Daily    lactulose oral solution 20 g  20 g Oral Daily    melatonin tablet 3 mg  3 mg Oral HS    ondansetron (ZOFRAN) injection 4 mg  4 mg Intravenous Q6H PRN    polyethylene glycol (MIRALAX) packet 17 g  17 g Oral Daily    potassium chloride (K-DUR,KLOR-CON) CR tablet 40 mEq  40 mEq Oral Once    spironolactone (ALDACTONE) tablet 50 mg  50 mg Oral Daily    thiamine tablet 100 mg  100 mg Oral Daily       VTE Pharmacologic Prophylaxis: Enoxaparin (Lovenox)  VTE Mechanical Prophylaxis: sequential compression device    Portions of the record may have been created with voice recognition software. Occasional wrong word or "sound a like" substitutions may have occurred due to the inherent limitations of voice recognition software. Read the chart carefully and recognize, using context, where substitutions have occurred.   ==  Nancy Reyna MD  [address] [facility]  Internal Medicine Residency PGY-1

## 2023-11-06 NOTE — WOUND OSTOMY CARE
Consult Note - Wound   Manuel Ann 66 y.o. male MRN: [id number]  Unit/Bed#: -01 Encounter: [id number]        Assessment Findings:   Wound care consulted for patient who is 67 yo incontinent male, dependent for all care for right hip redness. Ordered P-500 specialty bed due to patient's poor mobility and high risk for skin breakdown. Right hip- pink epithelial skin, blanchable, no drainage. Allevyn foam in place for protection. Sacral/buttocks and B/L heels intact and blanching, preventative skin care orders placed. Orders listed below and wound care will sign off, call or tiger text with questions. Bedside nurse updated of findings and orders. Flowsheets below with pictures and measurements. Skin care plans:  1-Hydraguard to bilateral sacrum, buttock and heels BID and PRN  2-Elevate heels to offload pressure. 3-Ehob cushion in chair when out of bed. 4-Moisturize skin daily with skin nourishing cream.  5-Turn/reposition q2h or when medically stable for pressure re-distribution on skin. 6-Cleanse right hip with soap and water.  Apply Allevyn foam, davon P for prevention, and change every 3 days and PRN soilage/displacement  7-P-500 specialty bed       Wounds:               Lourdes CARRILLON, RN, [employer]

## 2023-11-06 NOTE — SEDATION DOCUMENTATION
Paracentesis completed by Joana Jaramillo PA-C. Patient tolerated procedure well. 47373 clear yellow fluid drained on left side. No labs sent. Report given to bedside RN.

## 2023-11-06 NOTE — TELEPHONE ENCOUNTER
Patients mother called in regards to Froylan, he is currently in the hospital no discharge date as of now (11/6/23). She was wondering when she can schedule his hospital follow up and if he can get an ambulance to transport him to the appointment due to him being bedridden and unable to easily leave the house. Also, I don't believe he has ever been seen within the practice     Mothers # [phone]

## 2023-11-06 NOTE — DISCHARGE INSTR - OTHER ORDERS
Skin care plans:  1-Hydraguard to bilateral sacrum, buttock and heels BID and PRN  2-Elevate heels to offload pressure. 3-Ehob cushion in chair when out of bed. 4-Moisturize skin daily with skin nourishing cream.  5-Turn/reposition q2h or when medically stable for pressure re-distribution on skin. 6-Cleanse right hip with soap and water.  Apply Allevyn foam, davon P for prevention, and change every 3 days and PRN soilage/displacement

## 2023-11-06 NOTE — PLAN OF CARE
Problem: PAIN - ADULT  Goal: Verbalizes/displays adequate comfort level or baseline comfort level  Description: Interventions:  - Encourage patient to monitor pain and request assistance  - Assess pain using appropriate pain scale  - Administer analgesics based on type and severity of pain and evaluate response  - Implement non-pharmacological measures as appropriate and evaluate response  - Consider cultural and social influences on pain and pain management  - Notify physician/advanced practitioner if interventions unsuccessful or patient reports new pain  Outcome: Progressing     Problem: INFECTION - ADULT  Goal: Absence or prevention of progression during hospitalization  Description: INTERVENTIONS:  - Assess and monitor for signs and symptoms of infection  - Monitor lab/diagnostic results  - Monitor all insertion sites, i.e. indwelling lines, tubes, and drains  - Monitor endotracheal if appropriate and nasal secretions for changes in amount and color  - [place] appropriate cooling/warming therapies per order  - Administer medications as ordered  - Instruct and encourage patient and family to use good hand hygiene technique  - Identify and instruct in appropriate isolation precautions for identified infection/condition  Outcome: Progressing

## 2023-11-06 NOTE — TELEPHONE ENCOUNTER
Spoke to mother.  Pt will not be coming out of hospital soon, but would like to discuss transportation option with .  Please call Patricia at [phone]

## 2023-11-06 NOTE — PROGRESS NOTES
Progress Note -  [facility]  Deven Rondon 66 y.o. male MRN: [id number]  Unit/Bed#: -01 Encounter: [id number]      ASSESSMENT AND PLAN:      47-year-old male with past medical history of  cirrhosis likely secondary to type I autoimmune hepatitis decompensated by large volume ascites was admitted for large volume ascites. Decompensated cirrhosis  Autoimmune hepatitis  Ascites  Has had elevated IgG, JOSE E, anti-smooth muscle in the past consistent with type I autoimmune hepatitis which is likely the cause for his decompensated cirrhosis. No prior liver biopsy. He has a history of noncompliance. Trend MELD labs daily. Encourage compliance. No utility of steroids for autoimmune hepatitis given likely burned-out nature of hepatic function. No utility in liver biopsy as he clinically has cirrhosis. Agree with neuropsych evaluation  Ascites: Still has large volume ascites. Underwent paracentesis for 19 L. Was given albumin replacement. Renal function currently stable. Negative for SBP. Paracentesis as needed for patient comfort. Recommend albumin replacement at 6-8 grams per liter removed. Recommend weekly paracenteses. Hold on diuretics until after paracentesis. Can start Lasix 40 mg, Aldactone 100 mg and uptitrate as necessary. 2 g sodium diet  Monitor volume status and renal function. Varices: Reportedly had EGD in 4/21/2023, no report available. No evidence of bleeding currently. Repeat EGD as an outpatient. Monitor for signs of bleeding. Encephalopathy: Patient appears oriented and exhibits no signs of encephalopathy currently. He generally has poor insight into his condition. Monitor mental status. [address] screening: No recent ultrasound or contrast study. Will need [address] screening, can be done outpatient at this point. Transplant candidacy: Not a candidate due to poor compliance. Outpatient hepatology follow-up.     MELD 3.0: 17 at 11/4/2023  3:57 AM  MELD-Na: 17 at 11/4/2023  3:57 AM  Calculated from:  Serum Creatinine: 0.70 mg/dL (Using min of 1 mg/dL) at 11/4/2023  3:57 AM  Serum Sodium: 134 mmol/L at 11/4/2023  3:57 AM  Total Bilirubin: 2.27 mg/dL at 11/4/2023  3:57 AM  Serum Albumin: 2.7 g/dL at 11/4/2023  3:57 AM  INR(ratio): 1.54 at 11/2/2023 10:02 AM  Age at listing (hypothetical): 66 years  Sex: Male at 11/4/2023  3:57 AM      Rest of care per primary team.    ______________________________________________________________________    Subjective: Seen and examined. Still has large volume ascites and uncomfortable. Denies any nausea, vomiting. Patient initially denied that he had cirrhosis but after further conversations he acknowledges this. REVIEW OF SYSTEMS:    Review of Systems   Constitutional:  Negative for chills and fever. HENT:  Negative for congestion and sinus pressure. Respiratory:  Negative for cough and shortness of breath. Cardiovascular:  Negative for chest pain, palpitations and leg swelling. Gastrointestinal:  Positive for abdominal distention. Negative for abdominal pain, diarrhea, nausea and vomiting. Genitourinary:  Negative for dysuria and hematuria. Musculoskeletal:  Negative for arthralgias and back pain. Skin:  Negative for color change and rash. Neurological:  Negative for dizziness and headaches. Psychiatric/Behavioral:  Negative for agitation and confusion. All other systems reviewed and are negative. Historical Information   History reviewed. No pertinent past medical history. History reviewed. No pertinent surgical history. Social History   Social History     Substance and Sexual Activity   Alcohol Use Not Currently     Social History     Substance and Sexual Activity   Drug Use Never     Social History     Tobacco Use   Smoking Status Never    Passive exposure: Past   Smokeless Tobacco Never     History reviewed. No pertinent family history. Meds/Allergies     No medications prior to admission. Current Facility-Administered Medications   Medication Dose Route Frequency    albumin human (FLEXBUMIN) 5 % injection 25 g  25 g Intravenous Once    bisacodyl (DULCOLAX) rectal suppository 10 mg  10 mg Rectal Daily PRN    enoxaparin (LOVENOX) subcutaneous injection 40 mg  40 mg Subcutaneous Daily    folic acid (FOLVITE) tablet 1 mg  1 mg Oral Daily    lactulose oral solution 20 g  20 g Oral Daily    melatonin tablet 3 mg  3 mg Oral HS    ondansetron (ZOFRAN) injection 4 mg  4 mg Intravenous Q6H PRN    polyethylene glycol (MIRALAX) packet 17 g  17 g Oral Daily    potassium chloride (K-DUR,KLOR-CON) CR tablet 40 mEq  40 mEq Oral Once    spironolactone (ALDACTONE) tablet 50 mg  50 mg Oral Daily    thiamine tablet 100 mg  100 mg Oral Daily       No Known Allergies        Objective     Blood pressure 93/59, pulse 92, temperature 98.5 °F (36.9 °C), resp. rate 16, weight 127 kg (280 lb 3.3 oz), SpO2 94 %. There is no height or weight on file to calculate BMI. No intake or output data in the 24 hours ending 11/06/23 3047      PHYSICAL EXAM:      Physical Exam  Vitals and nursing note reviewed. Constitutional:       General: He is not in acute distress. Appearance: Normal appearance. He is well-developed. He is not ill-appearing. HENT:      Head: Normocephalic and atraumatic. Mouth/Throat:      Mouth: Mucous membranes are moist.   Eyes:      Extraocular Movements: Extraocular movements intact. Conjunctiva/sclera: Conjunctivae normal.   Cardiovascular:      Rate and Rhythm: Normal rate. Pulses: Normal pulses. Pulmonary:      Effort: Pulmonary effort is normal.   Abdominal:      General: Abdomen is flat. Bowel sounds are normal. There is distension. Palpations: Abdomen is soft. Tenderness: There is no abdominal tenderness. There is no guarding. Musculoskeletal:      Cervical back: Neck supple. Right lower leg: No edema. Left lower leg: No edema.    Skin:     General: Skin is warm and dry. Neurological:      General: No focal deficit present. Mental Status: He is alert and oriented to person, place, and time. Psychiatric:         Mood and Affect: Mood normal.         Behavior: Behavior normal.          Lab Results:   No results displayed because visit has over 200 results. Imaging Studies: I have personally reviewed pertinent imaging studies. [address] [place] [address] ANGELICA   Gastroenterology Fellow  PGY-5  Available via [employer]  11/6/2023 7:22 AM

## 2023-11-06 NOTE — CASE MANAGEMENT
Case Management Assessment & Discharge Planning Note    Patient name Kymberly Merida  Location [address] [facility] [address] [id number]/-[id number] MRN [id number]  : 1965 Date 2023       Current Admission Date: 2023  Current Admission Diagnosis:Decompensated hepatic cirrhosis [facility])   Patient Active Problem List    Diagnosis Date Noted    Anemia 2023    Class 2 obesity in adult 2023    Decompensated hepatic cirrhosis ([address]) 2023    Constipation 2023    Other ascites 2023      LOS (days): 4  Geometric Mean LOS (GMLOS) (days): 3.30  Days to GMLOS:-0.6     OBJECTIVE:    Risk of Unplanned Readmission Score: 8.23         Current admission status: Inpatient       Preferred Pharmacy:   [address] [place] [address]  No address on file      Primary Care Provider: No primary care provider on file. Primary Insurance: Doug LOPEZ  Secondary Insurance:     ASSESSMENT:  Tysonrt Proxies    There are no active Health Care Proxies on file. Readmission Root Cause  30 Day Readmission: No    Patient Information  Admitted from[de-identified] Home  Mental Status: Alert (lethargic)  During Assessment patient was accompanied by: Not accompanied during assessment  Assessment information provided by[de-identified] Patient  Primary Caregiver: Self  Support Systems: Self, Family members  [place] of Residence: [facility] [address] [place] [address] do you live in?:  [facility] entry access options.  Select all that apply.: Stairs  Number of steps to enter home.: 4 (flight to 2nd floor)  Do the steps have railings?: Yes  Type of Current Residence: Apartment  Floor Level: 2  Upon entering residence, is there a bedroom on the main floor (no further steps)?: Yes  Upon entering residence, is there a bathroom on the main floor (no further steps)?: Yes  In the last 12 months, was there a time when you were not able to pay the mortgage or rent on time?: No  In the last 12 months, how many places have you lived?: 1  In the last 12 months, was there a time when you did not have a steady place to sleep or slept in a shelter (including now)?: No  Homeless/housing insecurity resource given?: N/A  Living Arrangements: Lives w/ Extended Family (mother and brother)    Activities of Daily Living Prior to Admission  Functional Status: Independent  Completes ADLs independently?: Yes  Ambulates independently?: Yes  Does patient use assisted devices?: Yes  Assisted Devices (DME) used: [employer]  Does patient currently own DME?: Yes  What DME does the patient currently own?: [employer]  Does patient have a history of HHC?: Yes (HAD [facility] earlier this year but [facility]ES NOTwant [facility] if [facility] is needed. agreeable to [facility] if needed)  Does patient currently have [facility]?: No         Patient Information Continued  Income Source: Other (Comment) (applying for SSI/SSD.   Family supporting)  Does patient have prescription coverage?: Yes  Within the past 12 months, you worried that your food would run out before you got the money to buy more.: Never true  Within the past 12 months, the food you bought just didn't last and you didn't have money to get more.: Never true  Food insecurity resource given?: N/A  Does patient have a history of substance abuse?: No (denies ETOH)  Does patient have a history of Mental Health Diagnosis?: No         Means of Transportation  Means of Transport to Appts[de-identified] Drives Self  In the past 12 months, has lack of transportation kept you from medical appointments or from getting medications?: No  In the past 12 months, has lack of transportation kept you from meetings, work, or from getting things needed for daily living?: No  Was application for public transport provided?: N/A        DISCHARGE DETAILS:    Discharge planning discussed with[de-identified] patient  Freedom of Choice: Yes     CM contacted family/caregiver?: No- see comments (refused)             Contacts  Patient Contacts: Hawa Etienne (Mother)  [phone]  Relationship to Patient[de-identified] Family         Would you like to participate in our [address] service program?  : Yes         Additional Comments: Patient was slow to respond to questions. Patient states he DID NOT like [facility] but if he needs HHC at DC then will consider [facility]. Patient complained of pain so unable to discuss the possibility of rehab for DC needs. PT/OT recommending rehab at this time. Patient inconsistent with ongoing medical care in the community. Awaiting neuro psych consult recommendations for further DC needs. Patient states he served [employer] for 3-4 years but does not get  services. CM to be available        CM reviewed d/c planning process including the following: identifying help at home, patient preference for d/c planning needs, Discharge Lounge, [facility] Meds to Bed program, availability of treatment team to discuss questions or concerns patient and/or family may have regarding understanding medications and recognizing signs and symptoms once discharged. CM also encouraged patient to follow up with all recommended appointments after discharge. Patient advised of importance for patient and family to participate in managing patient’s medical well being.

## 2023-11-06 NOTE — PROGRESS NOTES
INTERNAL MEDICINE RESIDENCY PROGRESS NOTE     Name: Garold Mcardle   Age & Sex: 66 y.o. male   MRN: [id number]  Unit/Bed#: -01   Encounter: [id number]  Team: SOD Team C     PATIENT INFORMATION     Name: Garold Mcardle   Age & Sex: 66 y.o. male   MRN: [id number]  Hospital Stay Days: 4    ASSESSMENT/PLAN     Principal Problem:    Decompensated hepatic cirrhosis ([address])  Active Problems:    Constipation    Other ascites    Anemia      * Decompensated hepatic cirrhosis ([address])  Assessment & Plan  MELD 3.0: 17 at 11/4/2023  3:57 AM  MELD-Na: 17 at 11/4/2023  3:57 AM  Calculated from:  Serum Creatinine: 0.70 mg/dL (Using min of 1 mg/dL) at 11/4/2023  3:57 AM  Serum Sodium: 134 mmol/L at 11/4/2023  3:57 AM  Total Bilirubin: 2.27 mg/dL at 11/4/2023  3:57 AM  Serum Albumin: 2.7 g/dL at 11/4/2023  3:57 AM  INR(ratio): 1.54 at 11/2/2023 10:02 AM  Age at listing (hypothetical): 66 years  Sex: Male at 11/4/2023  3:57 AM     Patient currently presenting with decompensated cirrhosis with marked ascites and encephalopathy. 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 Patient has not had paracentesis since May 2023 and has not followed up with outpatient. S/p paracentesis on 11/3 with removal of 19 L. Partially repleted with albumin, although patient refused full dose. SAAG<1.1, fluid without bacterial growth as of 11/6. Patient presents with encephalopathy 11/6. Not mentating at baseline. Asterixis on exam. Reduced communication and responsiveness during examination. Considering liver biopsy in the future, also need to evaluate for [address] Plan:  Per IR paracentesis this afternoon  Liver biopsy once paracentesis completed; dedicated imaging to evaluate for [address]  Continue thiamine, folic acid   GI consulted, appreciate recs   Continue 5:2 ratio of aldactone/lasix today as well as lactulose.   Monitor outputs  Sodium and fluid restricted diet, ensure protein supplements  Consider serial paracenteses due to rapid fluid build up in abdomen  Given overall poor insight into condition and reluctance for appropriate therapy, will obtain neuropsych eval    Anemia  Assessment & Plan  Patient found to be anemic on presentation - hemoglobin improved to 9.5 following paracentesis. Previous iron panels notable for low normal iron, markedly low transferrin, elevated ferritin. No overt signs of bleeding at this time. Plan  Continue to monitor, especially following further paracenteses  Will consider repeat iron studies in the future    Other ascites  Assessment & Plan  See plan for "decompensated hepatic cirrhosis"    Constipation  Assessment & Plan  Patient endorsing constipation on admission, likely in setting of profound abdominal distention and ascites. He continues to endorse ongoing constipation even following paracentesis. Patient previously on lactulose. Of note, patient now with asterixis on exam this morning. Plan:  Continue lactulose therapy today; titrate to 3-4 bowel movements daily  Miralax/Dulcolax otherwise          Disposition: Patient requires further inpatient management. He is not stable and has progressed into encephalopathy. He currently lacks capacity for decision making. SUBJECTIVE     Patient seen and examined. No acute events overnight. Patient is markedly distended on exam. IR plans to remove more fluid this afternoon. Patient is not mentating at baseline currently, minimally responsive to questioning and unwilling to cooperate with exam. He states he is in some pain but does not respond to further questions. Patient currently lacks capacity for decision-making.      OBJECTIVE     Vitals:    11/05/23 1504 11/05/23 2142 11/05/23 2348 11/06/23 0822   BP: 100/60 99/60 93/59 104/64   Pulse: 91 85 92 82   Resp:       Temp: 98.6 °F (37 °C) 98.5 °F (36.9 °C)  97.5 °F (36.4 °C)   TempSrc:       SpO2: 100% 93% 94% 100%   Weight: Temperature:   Temp (24hrs), Av.2 °F (36.8 °C), Min:97.5 °F (36.4 °C), Max:98.6 °F (37 °C)    Temperature: 97.5 °F (36.4 °C)  Intake & Output:  I/O             P. O. 240      Total Intake(mL/kg) 240 (1.9)      Urine (mL/kg/hr) 175 (0.1)  100 (0.4)    Other       Total Output 175  100    Net +65  -100                 Weights: There is no height or weight on file to calculate BMI. Weight (last 2 days)       Date/Time Weight    23 06 127 (280.2)          Physical Exam  Constitutional:       Appearance: He is ill-appearing. Abdominal:      General: There is distension. Skin:     General: Skin is warm and dry. Neurological:      Mental Status: He is disoriented. Psychiatric:      Comments: Mentation is not at baseline       LABORATORY DATA     Labs: I have personally reviewed pertinent reports. Results from last 7 days   Lab Units 23  01423  0525 23  0357 23  0153 23  0511 23  0937   WBC Thousand/uL 7.58 7.51 4.59  --  5.48 6.92   HEMOGLOBIN g/dL 9.2* 9.5* 8.1*   < > 8.9* 10.3*   HEMATOCRIT % 29.8* 30.5* 25.4*   < > 27.7* 32.2*   PLATELETS Thousands/uL 157 171 141*  --  166 233   NEUTROS PCT %  --   --  55  --  62 69   MONOS PCT %  --   --  16*  --  17* 14*   MONO PCT %  --  12  --   --   --   --    EOS PCT %  --  4 4  --  3 3    < > = values in this interval not displayed. Results from last 7 days   Lab Units 23  0145 23  0525 23  0357 23  2158 23  0511   POTASSIUM mmol/L 4.0 3.8 3.4*   < > 3.8   CHLORIDE mmol/L 101 99 97   < > 99   CO2 mmol/L 34* 33* 30   < > 28   BUN mg/dL 8 8 10   < > 9   CREATININE mg/dL 0.65 0.67 0.70   < > 0.64   CALCIUM mg/dL 7.9* 8.1* 8.1*   < > 7.9*   ALK PHOS U/L  --  82 64  --  75   ALT U/L  --  4* <3*  --  <3*   AST U/L  --  33 26  --  25    < > = values in this interval not displayed.      Results from last 7 days   Lab Units 11/05/23  0525 11/04/23  0357 11/03/23  0511   MAGNESIUM mg/dL 1.9 1.9 1.7*     Results from last 7 days   Lab Units 11/05/23  0525 11/03/23  0511   PHOSPHORUS mg/dL 3.2 3.4      Results from last 7 days   Lab Units 11/02/23  1002   INR  1.54*   PTT seconds 45*               IMAGING & DIAGNOSTIC TESTING     Radiology Results: I have personally reviewed pertinent reports. US abdomen limited    Result Date: 11/2/2023  Impression: Ascites visible in all 4 quadrants. Workstation performed: [id number]     Other Diagnostic Testing: I have personally reviewed pertinent reports. ACTIVE MEDICATIONS     Current Facility-Administered Medications   Medication Dose Route Frequency    albumin human (FLEXBUMIN) 5 % injection 25 g  25 g Intravenous Once    bisacodyl (DULCOLAX) rectal suppository 10 mg  10 mg Rectal Daily PRN    enoxaparin (LOVENOX) subcutaneous injection 40 mg  40 mg Subcutaneous Daily    folic acid (FOLVITE) tablet 1 mg  1 mg Oral Daily    lactulose oral solution 20 g  20 g Oral Daily    melatonin tablet 3 mg  3 mg Oral HS    ondansetron (ZOFRAN) injection 4 mg  4 mg Intravenous Q6H PRN    polyethylene glycol (MIRALAX) packet 17 g  17 g Oral Daily    potassium chloride (K-DUR,KLOR-CON) CR tablet 40 mEq  40 mEq Oral Once    spironolactone (ALDACTONE) tablet 50 mg  50 mg Oral Daily    thiamine tablet 100 mg  100 mg Oral Daily       VTE Pharmacologic Prophylaxis: Enoxaparin (Lovenox)  VTE Mechanical Prophylaxis: sequential compression device    Portions of the record may have been created with voice recognition software. Occasional wrong word or "sound a like" substitutions may have occurred due to the inherent limitations of voice recognition software.   Read the chart carefully and recognize, using context, where substitutions have occurred.  ==  [facility]  Internal Medicine Residency PGY-***

## 2023-11-06 NOTE — PROGRESS NOTES
OP CM  and nurse kenneth claudio in regards to pts mother wanting transportation. Pt has [address] which provides transportation but let office know we would discuss options once pt is discharged from the hospital.  Pt is not cleared for dc at this point and inpt rehab is being recommended.

## 2023-11-06 NOTE — OCCUPATIONAL THERAPY NOTE
Occupational Therapy Cancel Note      Patient Name: Cesar Viera  PTHOD'E Date: 11/6/2023 11/06/23 0845   OT Last Visit   OT Visit Date 11/06/23   Note Type   Note type Cancelled Session   Cancel Reasons Refusal       OT orders received. Chart reviewed. Attempted to see pt for OT tx session. Pt adamantly refusing any activity/OOB at this time, despite encouragement. Reports "I need sleep." Will continue to follow and see pt as appropriate and able.      Cathy Dong MS, OTR/L

## 2023-11-07 ENCOUNTER — APPOINTMENT (INPATIENT)
Dept: RADIOLOGY | Facility: HOSPITAL | Age: 58
DRG: 283 | End: 2023-11-07
Payer: MEDICARE

## 2023-11-07 PROCEDURE — 97530 THERAPEUTIC ACTIVITIES: CPT

## 2023-11-07 PROCEDURE — 99232 SBSQ HOSP IP/OBS MODERATE 35: CPT | Performed by: INTERNAL MEDICINE

## 2023-11-07 PROCEDURE — 97112 NEUROMUSCULAR REEDUCATION: CPT

## 2023-11-07 PROCEDURE — 97110 THERAPEUTIC EXERCISES: CPT

## 2023-11-07 PROCEDURE — 99222 1ST HOSP IP/OBS MODERATE 55: CPT | Performed by: INTERNAL MEDICINE

## 2023-11-07 NOTE — PHYSICAL THERAPY NOTE
Physical Therapy Progress Note     11/07/23 1320   PT Last Visit   PT Visit Date 11/07/23   Note Type   Note Type Treatment   Pain Assessment   Pain Assessment Tool 0-10   Pain Score No Pain   Restrictions/Precautions   Other Precautions Cognitive; Chair Alarm; Bed Alarm; Fall Risk;Pain; Impulsive   Subjective   Subjective The patient making random statements. Bed Mobility   Rolling R 4  Minimal assistance   Additional items Assist x 1; Increased time required;Verbal cues; Bedrails   Rolling L 4  Minimal assistance   Additional items Assist x 1; Increased time required;Verbal cues; Bedrails   Supine to Sit 3  Moderate assistance   Additional items Assist x 1;HOB elevated; Bedrails; Increased time required;Verbal cues;LE management   Sit to Supine 3  Moderate assistance   Additional items Assist x 1;HOB elevated; Bedrails; Increased time required;Verbal cues;LE management   Balance   Static Sitting Poor +   Dynamic Sitting Poor   Activity Tolerance   Activity Tolerance Other (Comment)  (Cognition.)   Exercises   TKR Sitting;5 reps;Bilateral;AROM;AAROM;PROM  (x3 sets with varying participation.)   Assessment   Prognosis Fair   Problem List Decreased strength;Decreased endurance;Decreased mobility;Pain;Decreased cognition; Impaired judgement;Decreased safety awareness; Impaired balance;Decreased range of motion   Assessment The patient is at times lethargic, and at others hyperactive. He is difficult to redirect and engage. He is tangential and emotionaly labile as well after he fully wakes up. Extensive time was required in order to get him to stay alert, and utilized the head of the bed fully up and pivoted him to the side of the bed. The patient requires constant redirection during the session. He would be resistant to interventions, and then would ask when anything was going to be done. He was returned to bed with the alarm active and all needs within reach. Will continue to follow.    Barriers to Discharge Decreased caregiver support; Inaccessible home environment   Goals   Patient Goals Pt. made many random statements. STG Expiration Date 11/18/23   PT Treatment Day 1   Plan   Treatment/Interventions Therapeutic exercise; Endurance training;Cognitive reorientation;Patient/family training;Bed mobility;LE strengthening/ROM   Progress Slow progress, cognitive deficits   PT Frequency 2-3x/wk   Discharge Recommendation   Rehab Resource Intensity Level, PT I (Maximum Resource Intensity)   AM-PAC Basic Mobility Inpatient   Turning in Flat Bed Without Bedrails 2   Lying on Back to Sitting on Edge of Flat Bed Without Bedrails 2   Moving Bed to Chair 1   Standing Up From Chair Using Arms 1   Walk in Room 1   Climb 3-5 Stairs With Railing 1   Basic Mobility Inpatient Raw Score 8   Turning Head Towards Sound 2   Follow Simple Instructions 1   Low Function Basic Mobility Raw Score  11   Low Function Basic Mobility Standardized Score  16.55   Highest Level Of Mobility   JH-HLM Goal 3: Sit at edge of bed   JH-HLM Achieved 3: Sit at edge of bed         An AM-PAC Basic Mobility raw score less than 16 suggests the patient may benefit from discharge to post-acute rehab services.     Leonides German, PTA

## 2023-11-07 NOTE — PLAN OF CARE
Problem: Prexisting or High Potential for Compromised Skin Integrity  Goal: Skin integrity is maintained or improved  Description: INTERVENTIONS:  - Identify patients at risk for skin breakdown  - Assess and monitor skin integrity  - Assess and monitor nutrition and hydration status  - Monitor labs   - Assess for incontinence   - Turn and reposition patient  - Assist with mobility/ambulation  - Relieve pressure over bony prominences  - Avoid friction and shearing  - Provide appropriate hygiene as needed including keeping skin clean and dry  - Evaluate need for skin moisturizer/barrier cream  - Collaborate with interdisciplinary team   - Patient/family teaching  - Consider wound care consult   Outcome: Progressing     Problem: MOBILITY - ADULT  Goal: Maintain or return to baseline ADL function  Description: INTERVENTIONS:  -  Assess patient's ability to carry out ADLs; assess patient's baseline for ADL function and identify physical deficits which impact ability to perform ADLs (bathing, care of mouth/teeth, toileting, grooming, dressing, etc.)  - Assess/evaluate cause of self-care deficits   - Assess range of motion  - Assess patient's mobility; develop plan if impaired  - Assess patient's need for assistive devices and provide as appropriate  - Encourage maximum independence but intervene and supervise when necessary  - Involve family in performance of ADLs  - Assess for home care needs following discharge   - Consider OT consult to assist with ADL evaluation and planning for discharge  - Provide patient education as appropriate  Outcome: Progressing  Goal: Maintains/Returns to pre admission functional level  Description: INTERVENTIONS:  - Perform BMAT or MOVE assessment daily.   - Set and communicate daily mobility goal to care team and patient/family/caregiver. - Collaborate with rehabilitation services on mobility goals if consulted  - Perform Range of Motion 3 times a day.   - Reposition patient every 2 hours.  - Dangle patient 3 times a day  - Stand patient 3 times a day  - Ambulate patient 3 times a day  - Out of bed to chair 3 times a day   - Out of bed for meals 3 times a day  - Out of bed for toileting  - Record patient progress and toleration of activity level   Outcome: Progressing     Problem: PAIN - ADULT  Goal: Verbalizes/displays adequate comfort level or baseline comfort level  Description: Interventions:  - Encourage patient to monitor pain and request assistance  - Assess pain using appropriate pain scale  - Administer analgesics based on type and severity of pain and evaluate response  - Implement non-pharmacological measures as appropriate and evaluate response  - Consider cultural and social influences on pain and pain management  - Notify physician/advanced practitioner if interventions unsuccessful or patient reports new pain  Outcome: Progressing     Problem: INFECTION - ADULT  Goal: Absence or prevention of progression during hospitalization  Description: INTERVENTIONS:  - Assess and monitor for signs and symptoms of infection  - Monitor lab/diagnostic results  - Monitor all insertion sites, i.e. indwelling lines, tubes, and drains  - Monitor endotracheal if appropriate and nasal secretions for changes in amount and color  - [place] appropriate cooling/warming therapies per order  - Administer medications as ordered  - Instruct and encourage patient and family to use good hand hygiene technique  - Identify and instruct in appropriate isolation precautions for identified infection/condition  Outcome: Progressing  Goal: Absence of fever/infection during neutropenic period  Description: INTERVENTIONS:  - Monitor WBC    Outcome: Progressing     Problem: SAFETY ADULT  Goal: Maintain or return to baseline ADL function  Description: INTERVENTIONS:  -  Assess patient's ability to carry out ADLs; assess patient's baseline for ADL function and identify physical deficits which impact ability to perform ADLs (bathing, care of mouth/teeth, toileting, grooming, dressing, etc.)  - Assess/evaluate cause of self-care deficits   - Assess range of motion  - Assess patient's mobility; develop plan if impaired  - Assess patient's need for assistive devices and provide as appropriate  - Encourage maximum independence but intervene and supervise when necessary  - Involve family in performance of ADLs  - Assess for home care needs following discharge   - Consider OT consult to assist with ADL evaluation and planning for discharge  - Provide patient education as appropriate  Outcome: Progressing  Goal: Maintains/Returns to pre admission functional level  Description: INTERVENTIONS:  - Perform BMAT or MOVE assessment daily.   - Set and communicate daily mobility goal to care team and patient/family/caregiver. - Collaborate with rehabilitation services on mobility goals if consulted  - Perform Range of Motion 3 times a day. - Reposition patient every 2 hours.   - Dangle patient 3 times a day  - Stand patient 3 times a day  - Ambulate patient 3 times a day  - Out of bed to chair 3 times a day   - Out of bed for meals 3 times a day  - Out of bed for toileting  - Record patient progress and toleration of activity level   Outcome: Progressing  Goal: Patient will remain free of falls  Description: INTERVENTIONS:  - Educate patient/family on patient safety including physical limitations  - Instruct patient to call for assistance with activity   - Consult OT/PT to assist with strengthening/mobility   - Keep Call bell within reach  - Keep bed low and locked with side rails adjusted as appropriate  - Keep care items and personal belongings within reach  - Initiate and maintain comfort rounds  - Make Fall Risk Sign visible to staff  - Offer Toileting every 2 Hours, in advance of need  - Initiate/Maintain bed alarm  - Apply yellow socks and bracelet for high fall risk patients  - Consider moving patient to room near nurses station  Outcome: Progressing     Problem: DISCHARGE PLANNING  Goal: Discharge to home or other facility with appropriate resources  Description: INTERVENTIONS:  - Identify barriers to discharge w/patient and caregiver  - Arrange for needed discharge resources and transportation as appropriate  - Identify discharge learning needs (meds, wound care, etc.)  - Arrange for interpretive services to assist at discharge as needed  - Refer to Case Management Department for coordinating discharge planning if the patient needs post-hospital services based on physician/advanced practitioner order or complex needs related to functional status, cognitive ability, or social support system  Outcome: Progressing     Problem: Knowledge Deficit  Goal: Patient/family/caregiver demonstrates understanding of disease process, treatment plan, medications, and discharge instructions  Description: Complete learning assessment and assess knowledge base. Interventions:  - Provide teaching at level of understanding  - Provide teaching via preferred learning methods  Outcome: Progressing     Problem: Nutrition/Hydration-ADULT  Goal: Nutrient/Hydration intake appropriate for improving, restoring or maintaining nutritional needs  Description: Monitor and assess patient's nutrition/hydration status for malnutrition. Collaborate with interdisciplinary team and initiate plan and interventions as ordered. Monitor patient's weight and dietary intake as ordered or per policy. Utilize nutrition screening tool and intervene as necessary. Determine patient's food preferences and provide high-protein, high-caloric foods as appropriate.      INTERVENTIONS:  - Monitor oral intake, urinary output, labs, and treatment plans  - Assess nutrition and hydration status and recommend course of action  - Evaluate amount of meals eaten  - Assist patient with eating if necessary   - Allow adequate time for meals  - Recommend/ encourage appropriate diets, oral nutritional supplements, and vitamin/mineral supplements  - Order, calculate, and assess calorie counts as needed  - Recommend, monitor, and adjust tube feedings and TPN/PPN based on assessed needs  - Assess need for intravenous fluids  - Provide specific nutrition/hydration education as appropriate  - Include patient/family/caregiver in decisions related to nutrition  Outcome: Progressing

## 2023-11-07 NOTE — PLAN OF CARE
Problem: PHYSICAL THERAPY ADULT  Goal: Performs mobility at highest level of function for planned discharge setting. See evaluation for individualized goals. Description: Treatment/Interventions: Functional transfer training, Therapeutic exercise, Patient/family training, Bed mobility, OT  Equipment Recommended:  (TBD)       See flowsheet documentation for full assessment, interventions and recommendations. Outcome: Not Progressing  Note: Prognosis: Fair  Problem List: Decreased strength, Decreased endurance, Decreased mobility, Pain, Decreased cognition, Impaired judgement, Decreased safety awareness, Impaired balance, Decreased range of motion  Assessment: The patient is at times lethargic, and at others hyperactive. He is difficult to redirect and engage. He is tangential and emotionaly labile as well after he fully wakes up. Extensive time was required in order to get him to stay alert, and utilized the head of the bed fully up and pivoted him to the side of the bed. The patient requires constant redirection during the session. He would be resistant to interventions, and then would ask when anything was going to be done. He was returned to bed with the alarm active and all needs within reach. Will continue to follow. Barriers to Discharge: Decreased caregiver support, Inaccessible home environment     Rehab Resource Intensity Level, PT: I (Maximum Resource Intensity)    See flowsheet documentation for full assessment.

## 2023-11-07 NOTE — QUICK NOTE
Called patient's mother, went to . Patient's brother was updated on his hospital course and encouraged to meet for goals of care discussion.

## 2023-11-07 NOTE — PLAN OF CARE
Problem: Prexisting or High Potential for Compromised Skin Integrity  Goal: Skin integrity is maintained or improved  Description: INTERVENTIONS:  - Identify patients at risk for skin breakdown  - Assess and monitor skin integrity  - Assess and monitor nutrition and hydration status  - Monitor labs   - Assess for incontinence   - Turn and reposition patient  - Assist with mobility/ambulation  - Relieve pressure over bony prominences  - Avoid friction and shearing  - Provide appropriate hygiene as needed including keeping skin clean and dry  - Evaluate need for skin moisturizer/barrier cream  - Collaborate with interdisciplinary team   - Patient/family teaching  - Consider wound care consult   Outcome: Progressing     Problem: MOBILITY - ADULT  Goal: Maintain or return to baseline ADL function  Description: INTERVENTIONS:  -  Assess patient's ability to carry out ADLs; assess patient's baseline for ADL function and identify physical deficits which impact ability to perform ADLs (bathing, care of mouth/teeth, toileting, grooming, dressing, etc.)  - Assess/evaluate cause of self-care deficits   - Assess range of motion  - Assess patient's mobility; develop plan if impaired  - Assess patient's need for assistive devices and provide as appropriate  - Encourage maximum independence but intervene and supervise when necessary  - Involve family in performance of ADLs  - Assess for home care needs following discharge   - Consider OT consult to assist with ADL evaluation and planning for discharge  - Provide patient education as appropriate  Outcome: Progressing  Goal: Maintains/Returns to pre admission functional level  Description: INTERVENTIONS:  - Perform BMAT or MOVE assessment daily.   - Set and communicate daily mobility goal to care team and patient/family/caregiver. - Collaborate with rehabilitation services on mobility goals if consulted  - Perform Range of Motion  times a day.   - Reposition patient every hours.  - Dangle patient times a day  - Stand patient  times a day  - Ambulate patient  times a day  - Out of bed to chair times a day   - Out of bed for meals  times a day  - Out of bed for toileting  - Record patient progress and toleration of activity level   Outcome: Progressing     Problem: PAIN - ADULT  Goal: Verbalizes/displays adequate comfort level or baseline comfort level  Description: Interventions:  - Encourage patient to monitor pain and request assistance  - Assess pain using appropriate pain scale  - Administer analgesics based on type and severity of pain and evaluate response  - Implement non-pharmacological measures as appropriate and evaluate response  - Consider cultural and social influences on pain and pain management  - Notify physician/advanced practitioner if interventions unsuccessful or patient reports new pain  Outcome: Progressing     Problem: INFECTION - ADULT  Goal: Absence or prevention of progression during hospitalization  Description: INTERVENTIONS:  - Assess and monitor for signs and symptoms of infection  - Monitor lab/diagnostic results  - Monitor all insertion sites, i.e. indwelling lines, tubes, and drains  - Monitor endotracheal if appropriate and nasal secretions for changes in amount and color  - [place] appropriate cooling/warming therapies per order  - Administer medications as ordered  - Instruct and encourage patient and family to use good hand hygiene technique  - Identify and instruct in appropriate isolation precautions for identified infection/condition  Outcome: Progressing  Goal: Absence of fever/infection during neutropenic period  Description: INTERVENTIONS:  - Monitor WBC    Outcome: Progressing     Problem: SAFETY ADULT  Goal: Maintain or return to baseline ADL function  Description: INTERVENTIONS:  -  Assess patient's ability to carry out ADLs; assess patient's baseline for ADL function and identify physical deficits which impact ability to perform ADLs (bathing, care of mouth/teeth, toileting, grooming, dressing, etc.)  - Assess/evaluate cause of self-care deficits   - Assess range of motion  - Assess patient's mobility; develop plan if impaired  - Assess patient's need for assistive devices and provide as appropriate  - Encourage maximum independence but intervene and supervise when necessary  - Involve family in performance of ADLs  - Assess for home care needs following discharge   - Consider OT consult to assist with ADL evaluation and planning for discharge  - Provide patient education as appropriate  Outcome: Progressing  Goal: Maintains/Returns to pre admission functional level  Description: INTERVENTIONS:  - Perform BMAT or MOVE assessment daily.   - Set and communicate daily mobility goal to care team and patient/family/caregiver. - Collaborate with rehabilitation services on mobility goals if consulted  - Perform Range of Motion times a day. - Reposition patient every  hours. -   Dangle patient  times a day  - Stand patient  times a day  - Ambulate patient times a day  - Out of bed to chair times a day   - Out of bed for meals  times a day  - Out of bed for toileting  - Record patient progress and toleration of activity level   Outcome: Progressing  Goal: Patient will remain free of falls  Description: INTERVENTIONS:  - Educate patient/family on patient safety including physical limitations  - Instruct patient to call for assistance with activity   - Consult OT/PT to assist with strengthening/mobility   - Keep Call bell within reach  - Keep bed low and locked with side rails adjusted as appropriate  - Keep care items and personal belongings within reach  - Initiate and maintain comfort rounds  - Make Fall Risk Sign visible to staff  - Offer Toileting every  Hours, in advance of need  - Initiate/Maintain alarm  - Obtain necessary fall risk management equipment:   - Apply yellow socks and bracelet for high fall risk patients  - Consider moving patient to room near nurses station  Outcome: Progressing     Problem: DISCHARGE PLANNING  Goal: Discharge to home or other facility with appropriate resources  Description: INTERVENTIONS:  - Identify barriers to discharge w/patient and caregiver  - Arrange for needed discharge resources and transportation as appropriate  - Identify discharge learning needs (meds, wound care, etc.)  - Arrange for interpretive services to assist at discharge as needed  - Refer to Case Management Department for coordinating discharge planning if the patient needs post-hospital services based on physician/advanced practitioner order or complex needs related to functional status, cognitive ability, or social support system  Outcome: Progressing     Problem: Knowledge Deficit  Goal: Patient/family/caregiver demonstrates understanding of disease process, treatment plan, medications, and discharge instructions  Description: Complete learning assessment and assess knowledge base. Interventions:  - Provide teaching at level of understanding  - Provide teaching via preferred learning methods  Outcome: Progressing     Problem: Nutrition/Hydration-ADULT  Goal: Nutrient/Hydration intake appropriate for improving, restoring or maintaining nutritional needs  Description: Monitor and assess patient's nutrition/hydration status for malnutrition. Collaborate with interdisciplinary team and initiate plan and interventions as ordered. Monitor patient's weight and dietary intake as ordered or per policy. Utilize nutrition screening tool and intervene as necessary. Determine patient's food preferences and provide high-protein, high-caloric foods as appropriate.      INTERVENTIONS:  - Monitor oral intake, urinary output, labs, and treatment plans  - Assess nutrition and hydration status and recommend course of action  - Evaluate amount of meals eaten  - Assist patient with eating if necessary   - Allow adequate time for meals  - Recommend/ encourage appropriate diets, oral nutritional supplements, and vitamin/mineral supplements  - Order, calculate, and assess calorie counts as needed  - Recommend, monitor, and adjust tube feedings and TPN/PPN based on assessed needs  - Assess need for intravenous fluids  - Provide specific nutrition/hydration education as appropriate  - Include patient/family/caregiver in decisions related to nutrition  Outcome: Progressing

## 2023-11-07 NOTE — PROGRESS NOTES
INTERNAL MEDICINE RESIDENCY PROGRESS NOTE     Name: Laisha Romero   Age & Sex: 66 y.o. male   MRN: [id number]  Unit/Bed#: -[facility]   Encounter: [id number]  Team: SOD Team C     PATIENT INFORMATION     Name: Laisha Romero   Age & Sex: 66 y.o. male   MRN: [id number]  Hospital Stay Days: 5    ASSESSMENT/PLAN     Principal Problem:    Decompensated hepatic cirrhosis ([address])  Active Problems:    Constipation    Other ascites    Anemia      Anemia  Assessment & Plan  Patient found to be anemic on presentation - hemoglobin improved to 9.5 following paracentesis. Previous iron panels notable for low normal iron, markedly low transferrin, elevated ferritin. No overt signs of bleeding at this time. Plan  Continue to monitor, especially following further paracenteses  Will consider repeat iron studies in the future    Other ascites  Assessment & Plan  See plan for "decompensated hepatic cirrhosis"    Constipation  Assessment & Plan  Patient endorsing constipation on admission, likely in setting of profound abdominal distention and ascites. He continues to endorse ongoing constipation even following paracentesis. Patient previously on lactulose. Of note, patient now with asterixis on exam this morning. Patient refusing lactulose since 11/5. Consider lactulose enema. Plan:  Continue lactulose therapy today; enema if PO is refused.   Titrate to 3-4 bowel movements daily  Miralax/Dulcolax otherwise      * Decompensated hepatic cirrhosis (HCC)  Assessment & Plan  MELD 3.0: 17 at 11/4/2023  3:57 AM  MELD-Na: 17 at 11/4/2023  3:57 AM  Calculated from:  Serum Creatinine: 0.70 mg/dL (Using min of 1 mg/dL) at 11/4/2023  3:57 AM  Serum Sodium: 134 mmol/L at 11/4/2023  3:57 AM  Total Bilirubin: 2.27 mg/dL at 11/4/2023  3:57 AM  Serum Albumin: 2.7 g/dL at 11/4/2023  3:57 AM  INR(ratio): 1.54 at 11/2/2023 10:02 AM  Age at listing (hypothetical): 58 years  Sex: Male at 11/4/2023  3:57 AM     Patient currently presenting with decompensated cirrhosis with marked ascites and encephalopathy. 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 However, patient's mother also revealed a significant history of alcohol abuse. Likely dual etiology. Patient has not had paracentesis since May 2023 and has not followed up with outpatient. S/p paracentesis on 11/3 with removal of 19 L. Partially repleted with albumin, although patient refused full dose. SAAG<1.1, fluid without bacterial growth as of 11/6. S/p paracentesis on 11/6 with removal of 16.45L. Not repleted with albumin, but BP has been stable. Continue to monitor vitals with concern for infection. Patient continues to present with encephalopathy. Not mentating at baseline. Asterixis on exam. Reduced communication and responsiveness during examination. Patient has refused all meds since 11/5 and has not had a bowel movement in several days. Concern for rising ammonia levels and increased encephalopathy. May need to utilize lactulose enema if PO continues to be refused by patient. Considering liver biopsy in the future, also need to evaluate for [address] Plan:  Consider lactulose enema  Liver biopsy once paracentesis completed; dedicated imaging to evaluate for [address]  Continue thiamine, folic acid   GI consulted, appreciate recs   Continue 5:2 ratio of aldactone/lasix   Monitor outputs  Sodium and fluid restricted diet, ensure protein supplements  Consider serial paracenteses due to rapid fluid build up in abdomen  6-8g/liter albumin replacement for paracentesis > 5L  Given overall poor insight into condition and reluctance for appropriate therapy, will obtain neuropsych eval; per discussion with his mother, no official power of  currently established        Disposition: Patient will continue to be monitored inpatient.  While many L of fluid was removed from patient's abdomen his mentation has not improved. SUBJECTIVE     Patient seen and examined. No acute events overnight. Patient is somnolent on exam and not totally arousable. Abdomen is notably softer on exam after removal of 16.45L ascitic fluid yesterday evening. Patient has been refusing all medications for the last few days and has not had a bowel movement. Has otherwise been normotensive, afebrile and not tachycardic. OBJECTIVE     Vitals:    23 2219 23 0004 23 0600 23 0721   BP: 110/61 110/65  101/61   Pulse: 90 84  79   Resp:       Temp: 98.3 °F (36.8 °C)   (!) 95.7 °F (35.4 °C)   TempSrc:       SpO2: 100% 99%  100%   Weight:   125 kg (276 lb 6.4 oz)       Temperature:   Temp (24hrs), Av.4 °F (36.3 °C), Min:95.7 °F (35.4 °C), Max:98.3 °F (36.8 °C)    Temperature: (!) 95.7 °F (35.4 °C)  Intake & Output:  I/O             P. O.  0     Total Intake(mL/kg)  0 (0)     Urine (mL/kg/hr)  100 (0)     Other  77969     Total Output  49051     Net  [id number]                  Weights: There is no height or weight on file to calculate BMI. Weight (last 2 days)       Date/Time Weight    23 06 125 (276.4)    23 06 127 (280.2)          Physical Exam  Constitutional:       Comments: Patient is somnolent, minimally responsive to voice, localizes to pain. HENT:      Head: Normocephalic and atraumatic. Right Ear: External ear normal.      Left Ear: External ear normal.      Nose: Nose normal.      Mouth/Throat:      Mouth: Mucous membranes are dry. Pharynx: Oropharynx is clear. Eyes:      Pupils: Pupils are equal, round, and reactive to light. Cardiovascular:      Rate and Rhythm: Normal rate and regular rhythm. Pulses: Normal pulses. Heart sounds: Normal heart sounds. Pulmonary:      Effort: Pulmonary effort is normal.   Abdominal:      General: There is distension. Palpations: Abdomen is soft.       Tenderness: There is no guarding or rebound. Comments: Abdominal distention improved paracentesis, no significant tenderness throughout. Musculoskeletal:      Cervical back: Normal range of motion and neck supple. Right lower leg: No edema. Left lower leg: No edema. Skin:     General: Skin is warm. Neurological:      Mental Status: He is disoriented. Psychiatric:      Comments: Not fully arousable       LABORATORY DATA     Labs: I have personally reviewed pertinent reports. Results from last 7 days   Lab Units 11/06/23 0145 11/05/23 0525 11/04/23 0357 11/04/23  0153 11/03/23 0511 11/02/23  0937   WBC Thousand/uL 7.58 7.51 4.59  --  5.48 6.92   HEMOGLOBIN g/dL 9.2* 9.5* 8.1*   < > 8.9* 10.3*   HEMATOCRIT % 29.8* 30.5* 25.4*   < > 27.7* 32.2*   PLATELETS Thousands/uL 157 171 141*  --  166 233   NEUTROS PCT %  --   --  55  --  62 69   MONOS PCT %  --   --  16*  --  17* 14*   MONO PCT %  --  12  --   --   --   --    EOS PCT %  --  4 4  --  3 3    < > = values in this interval not displayed. Results from last 7 days   Lab Units 11/06/23 0145 11/05/23 0525 11/04/23 0357 11/03/23  2158 11/03/23  0511   POTASSIUM mmol/L 4.0 3.8 3.4*   < > 3.8   CHLORIDE mmol/L 101 99 97   < > 99   CO2 mmol/L 34* 33* 30   < > 28   BUN mg/dL 8 8 10   < > 9   CREATININE mg/dL 0.65 0.67 0.70   < > 0.64   CALCIUM mg/dL 7.9* 8.1* 8.1*   < > 7.9*   ALK PHOS U/L  --  82 64  --  75   ALT U/L  --  4* <3*  --  <3*   AST U/L  --  33 26  --  25    < > = values in this interval not displayed. Results from last 7 days   Lab Units 11/05/23 0525 11/04/23 0357 11/03/23 0511   MAGNESIUM mg/dL 1.9 1.9 1.7*     Results from last 7 days   Lab Units 11/05/23  0525 11/03/23  0511   PHOSPHORUS mg/dL 3.2 3.4      Results from last 7 days   Lab Units 11/02/23  1002   INR  1.54*   PTT seconds 45*               IMAGING & DIAGNOSTIC TESTING     Radiology Results: I have personally reviewed pertinent reports.   US abdomen limited    Result Date: 11/2/2023  Impression: Ascites visible in all 4 quadrants. Workstation performed: [id number]     Other Diagnostic Testing: I have personally reviewed pertinent reports. ACTIVE MEDICATIONS     Current Facility-Administered Medications   Medication Dose Route Frequency    bisacodyl (DULCOLAX) rectal suppository 10 mg  10 mg Rectal Daily PRN    enoxaparin (LOVENOX) subcutaneous injection 40 mg  40 mg Subcutaneous Daily    folic acid (FOLVITE) tablet 1 mg  1 mg Oral Daily    lactulose oral solution 20 g  20 g Oral Daily    melatonin tablet 3 mg  3 mg Oral HS    ondansetron (ZOFRAN) injection 4 mg  4 mg Intravenous Q6H PRN    polyethylene glycol (MIRALAX) packet 17 g  17 g Oral Daily    spironolactone (ALDACTONE) tablet 50 mg  50 mg Oral Daily    thiamine tablet 100 mg  100 mg Oral Daily       VTE Pharmacologic Prophylaxis: Enoxaparin (Lovenox)  VTE Mechanical Prophylaxis: sequential compression device    Portions of the record may have been created with voice recognition software. Occasional wrong word or "sound a like" substitutions may have occurred due to the inherent limitations of voice recognition software. Read the chart carefully and recognize, using context, where substitutions have occurred.   ==  Camille Rodney MD  [address] [facility]  Internal Medicine Residency PGY-1

## 2023-11-07 NOTE — UTILIZATION REVIEW
Continued Stay Review    Date: 11/07/23                          Current Patient Class: IP  Current Level of Care: Med/Surg    HPI:58 y.o. male initially admitted on 11/2. Paracentesis on 11/3 removed 19,250 mL ascitic fluid. Assessment/Plan: Pt somnolent on exam, not entirely arousable. Abdomen notably softer after removal of 16,450 mL ascitic fluid yesterday. Pt has been refusing all meds, no BM. On exam, somnolent, dry mucous membranes, abdominal distention, disoriented. Plan: consider lactulose enema, liver biopsy, thiamine/folic acid, continue aldactone/lasix, I&O, sodium/fluid restriction, consider serial paracenteses s/t rapid fluid build up in abdomen. 6-8g/L albumin replacement for paracentesis > 5L. Neuropsych evaluation.      Vital Signs:   Date/Time Temp Pulse Resp BP MAP (mmHg) SpO2 Calculated FIO2 (%) - Nasal Cannula Nasal Cannula O2 Flow Rate (L/min) O2 Device   11/07/23 07:21:38 95.7 °F (35.4 °C) Abnormal  79 -- 101/61 74 100 % -- -- --   11/07/23 0100 -- -- -- -- -- -- 28 2 L/min Nasal cannula   11/07/23 00:04:06 -- 84 -- 110/65 80 99 % -- -- --   11/06/23 22:19:55 98.3 °F (36.8 °C) 90 -- 110/61 77 100 % -- -- --   11/06/23 18:50:39 97.7 °F (36.5 °C) 86 -- 108/69 82 100 % -- -- --   11/06/23 17:54:42 97.8 °F (36.6 °C) 87 -- 120/69 86 100 % -- -- --   11/06/23 17:01:13 -- 87 18 100/61 -- 100 % -- -- --   11/06/23 16:46:35 -- -- -- 100/59 -- -- -- -- --   11/06/23 16:36:21 -- -- -- 96/62 -- -- -- -- --   11/06/23 16:26:32 -- -- -- 94/55 -- -- -- -- --   11/06/23 16:15:55 -- -- -- 89/55 Abnormal  -- -- -- -- --   11/06/23 16:05:12 -- -- -- 90/51 -- -- -- -- --   11/06/23 15:56:39 -- -- -- 101/60 -- -- -- -- --   11/06/23 15:29:42 -- -- -- 90/53 -- -- -- -- --   11/06/23 15:26:58 -- 73 -- 88/54 Abnormal  -- 98 % -- -- --   11/06/23 08:22:59 97.5 °F (36.4 °C) 82 -- 104/64 77 100 % -- -- --       Pertinent Labs/Diagnostic Results:     Results from last 7 days   Lab Units 11/06/23  0145 11/05/23  0525 11/04/23  0357 11/04/23  0153 11/03/23  0511 11/02/23  0937   WBC Thousand/uL 7.58 7.51 4.59  --  5.48 6.92   HEMOGLOBIN g/dL 9.2* 9.5* 8.1* 7.8* 8.9* 10.3*   HEMATOCRIT % 29.8* 30.5* 25.4* 23.8* 27.7* 32.2*   PLATELETS Thousands/uL 157 171 141*  --  166 233   NEUTROS ABS Thousands/µL  --   --  2.55  --  3.40 4.78         Results from last 7 days   Lab Units 11/06/23  0145 11/05/23  0525 11/04/23 0357 11/03/23 2158 11/03/23  0511   SODIUM mmol/L 137 135 134* 134* 134*   POTASSIUM mmol/L 4.0 3.8 3.4* 3.7 3.8   CHLORIDE mmol/L 101 99 97 99 99   CO2 mmol/L 34* 33* 30 31 28   ANION GAP mmol/L 2 3 7 4 7   BUN mg/dL 8 8 10 10 9   CREATININE mg/dL 0.65 0.67 0.70 0.75 0.64   EGFR ml/min/1.73sq m 108 107 105 102 109   CALCIUM mg/dL 7.9* 8.1* 8.1* 8.1* 7.9*   MAGNESIUM mg/dL  --  1.9 1.9  --  1.7*   PHOSPHORUS mg/dL  --  3.2  --   --  3.4     Results from last 7 days   Lab Units 11/05/23  0525 11/04/23 0357 11/04/23 0351 11/03/23  0511 11/02/23  0937   AST U/L 33 26  --  25 25   ALT U/L 4* <3*  --  <3* 5*   ALK PHOS U/L 82 64  --  75 94   TOTAL PROTEIN g/dL 6.4 6.4  --  6.5 7.4   ALBUMIN g/dL 2.5* 2.7*  --  1.9* 2.3*   TOTAL BILIRUBIN mg/dL 1.84* 2.27*  --  2.07* 2.37*   BILIRUBIN DIRECT mg/dL  --   --   --   --  1.02*   AMMONIA umol/L  --   --  73*  --   --          Results from last 7 days   Lab Units 11/06/23  0145 11/05/23  0525 11/04/23  0357 11/03/23  2158 11/03/23  0511 11/02/23  0937   GLUCOSE RANDOM mg/dL 113 95 99 94 71 91         Results from last 7 days   Lab Units 11/02/23  1002   PROTIME seconds 18.2*   INR  1.54*   PTT seconds 45*     Results from last 7 days   Lab Units 11/02/23  0937   LIPASE u/L 12     Results from last 7 days   Lab Units 11/03/23  1433   GRAM STAIN RESULT  No Polys or Bacteria seen   BODY FLUID CULTURE, STERILE  No growth     Results from last 7 days   Lab Units 11/03/23  1432   TOTAL COUNTED  100   WBC FLUID /ul 23               Medications:   Scheduled Medications:  enoxaparin, 40 mg, Subcutaneous, Daily  folic acid, 1 mg, Oral, Daily  lactulose, 20 g, Oral, Daily  melatonin, 3 mg, Oral, HS  polyethylene glycol, 17 g, Oral, Daily  spironolactone, 50 mg, Oral, Daily  thiamine, 100 mg, Oral, Daily    Continuous IV Infusions: none    PRN Meds:  bisacodyl, 10 mg, Rectal, Daily PRN  ondansetron, 4 mg, Intravenous, Q6H PRN    ketorolac (TORADOL) injection 15 mg  Dose: 15 mg  Freq: Once Route: IM  Start: 11/06/23 0045 End: 11/06/23 0052     Discharge Plan: D    Network Utilization Review Department  ATTENTION: Please call with any questions or concerns to [phone] and carefully listen to the prompts so that you are directed to the right person. All voicemails are confidential.   For Discharge needs, contact Care Management DC Support Team at [phone] opt. [phone]  Send all requests for admission clinical reviews, approved or denied determinations and any other requests to dedicated fax number below belonging to the campus where the patient is receiving treatment.  List of dedicated fax numbers for the Facilities:  [place] DENIALS (Administrative/Medical Necessity) [phone]   DISCHARGE SUPPORT TEAM (NETWORK) [id number] [facility] (Maternity/NICU/Pediatrics) [phone]   [address] [facility] [address] [facility] [address] [facility] [phone] 1505 [facility] 207 [facility] [address] [facility] [address] [address] [facility] [address] [facility] [address] [facility] [address] [phone]

## 2023-11-07 NOTE — CONSULTS
Consultation - Palliative and Supportive Care   Ashish Tyler 66 y.o. male [id number]    Patient Active Problem List   Diagnosis    Class 2 obesity in adult    Decompensated hepatic cirrhosis (HCC)    Constipation    Other ascites    Anemia     Active issues specifically addressed today include:   Decompensated cirrhosis  Noncompliance  Goals of Care  Abdominal Ascites  Hepatic Encephalophy    Plan:  1. Symptom management -    Recommend global delirium precautions including:      - Establishment of day/night cycle via lights during the day and blinds open. Please limit interruptions at night as medically appropriate. - Provide glasses/hearing aids as apprioriate. - Minimize deliriogenic meds as able. - Provide reorientation including date on board and visible clock. - Avoid restraints as able, frequent verbal reorientations or patient care sitter as appropriate. 2. Goals -  Phone call made to patient's mother. No answer. Will return to conversation tomorrow. Current working medical decision maker based on available data as below. Patient clearly not competent today (confirmed by neuropsych consult), therefore in case of emergency:    Consult received to help identify a decision maker. [place], decisional hierarchy is as follows, in decreasing order of authority:     1st - Current spouse (unless one of the parties has filed for divorce), PLUS any and all children from previous union(s) - no documented spouse. (Please note, since 1/2/2005, [place] does not recognize 'common law' unions.   Prior to 1/2/2005, an established 'common law' union IS recognized as a formal legal marriage for purposes of decision-making.)    2nd - All adult child(eric) from any union - No documented adult children     3rd - Parent(s), either by blood or legal adoption - mother     4th - Adult sibling(s), either by blood or legal adoption - brother     11th - Adult grandchild(eric) from any union - no documented     6th - Any adult who is familiar with pt's wishes, desires, concerns, and/or medical history - Breanne Rodas MD       I have reviewed the patient's controlled substance dispensing history in the Prescription Drug Monitoring Program in compliance with the [facility] regulations before prescribing any controlled substances. We appreciate the invitation to be involved in this patient's care. We will follow. Please do not hesitate to reach our on call provider through our clinic answering service at [phone] should you have acute symptom control concerns. Barbie Garcia MD  Palliative and Supportive Care  Clinic/Answering Service: [phone]  You can find me on [email or web address]! IDENTIFICATION:  Inpatient consult to Palliative Care  Consult performed by: Barbie Garcia MD  Consult ordered by: Lisa Edmonds MD        Physician Requesting Consult: Juliana Yung*  Reason for Consult / Principal Problem: goals of care, identify decision maker  Hx and PE limited by: patient encephalopathy    NARRATIVE AND INTERVAL HISTORY:       Nora Wright is a 66 y.o. male with known cirrhosis and poor healthcare compliance who presents for distended abdomen and discomfort. Hx is limited and found almost exclusively through chart perusal.  Patient was seen at [facility] in the spring for the same and then discharged with poor follow up. Patient found to have significant ascites and s/p multiple large volume paracenteses (total 35L in 3 days). Patient quite encephalopathic and [address] consulted to assist with goals of care. Exam profoundly limited. On entering room and awakening patient he states "Oh no. Not you!" And then falls back asleep. Attempted to call pts mother but went to . Review of Systems   Unable to perform ROS: Mental status change       History reviewed. No pertinent past medical history. History reviewed. No pertinent surgical history.   Social History     Socioeconomic History    Marital status: Single     Spouse name: Not on file    Number of children: Not on file    Years of education: Not on file    Highest education level: Not on file   Occupational History    Not on file   Tobacco Use    Smoking status: Never     Passive exposure: Past    Smokeless tobacco: Never   Vaping Use    Vaping Use: Never used   Substance and Sexual Activity    Alcohol use: Not Currently    Drug use: Never    Sexual activity: Not Currently   Other Topics Concern    Not on file   Social History Narrative    Not on file     Social Determinants of Health     Financial Resource Strain: Not on file   Food Insecurity: No Food Insecurity (11/6/2023)    Hunger Vital Sign     Worried About Running Out of Food in the Last Year: Never true     Ran Out of Food in the Last Year: Never true   Transportation Needs: No Transportation Needs (11/6/2023)    PRAPARE - Transportation     Lack of Transportation (Medical): No     Lack of Transportation (Non-Medical): No   Physical Activity: Not on file   Stress: Not on file   Social Connections: Not on file   Intimate Partner Violence: Not on file   Housing Stability: Low Risk  (11/6/2023)    Housing Stability Vital Sign     Unable to Pay for Housing in the Last Year: No     Number of Places Lived in the Last Year: 1     Unstable Housing in the Last Year: No     History reviewed. No pertinent family history.     MEDICATIONS / ALLERGIES:    all current active meds have been reviewed and current meds:   Current Facility-Administered Medications   Medication Dose Route Frequency    bisacodyl (DULCOLAX) rectal suppository 10 mg  10 mg Rectal Daily PRN    enoxaparin (LOVENOX) subcutaneous injection 40 mg  40 mg Subcutaneous Daily    folic acid (FOLVITE) tablet 1 mg  1 mg Oral Daily    lactulose oral solution 20 g  20 g Oral Daily    melatonin tablet 3 mg  3 mg Oral HS    ondansetron (ZOFRAN) injection 4 mg  4 mg Intravenous Q6H PRN    polyethylene glycol (MIRALAX) packet 17 g  17 g Oral Daily    spironolactone (ALDACTONE) tablet 50 mg  50 mg Oral Daily    thiamine tablet 100 mg  100 mg Oral Daily       No Known Allergies    OBJECTIVE:    Physical Exam  Physical Exam  Vitals and nursing note reviewed. Constitutional:       General: He is not in acute distress. Appearance: He is ill-appearing. He is not toxic-appearing or diaphoretic. HENT:      Head: Normocephalic and atraumatic. Right Ear: External ear normal.      Left Ear: External ear normal.      Nose: Nose normal.      Mouth/Throat:      Mouth: Mucous membranes are dry. Eyes:      General: Scleral icterus present. Right eye: No discharge. Left eye: No discharge. Cardiovascular:      Rate and Rhythm: Normal rate. Pulmonary:      Effort: No respiratory distress. Abdominal:      General: There is distension. Palpations: Abdomen is soft. Musculoskeletal:         General: Swelling and deformity present. Skin:     General: Skin is warm and dry. Coloration: Skin is jaundiced and pale. Neurological:      Comments: Awakens to questioning. Not participating in exam.          Lab Results: I have personally reviewed pertinent labs. , CBC: No results found for: "WBC", "HGB", "HCT", "MCV", "PLT", "ADJUSTEDWBC", "RBC", "MCH", "MCHC", "RDW", "MPV", "NRBC", CMP: No results found for: "SODIUM", "K", "CL", "CO2", "ANIONGAP", "BUN", "CREATININE", "GLUCOSE", "CALCIUM", "AST", "ALT", "ALKPHOS", "PROT", "BILITOT", "EGFR", BMP:No results found for: "SODIUM", "K", "CL", "CO2", "BUN", "CREATININE", "GLUC", "GLUF", "CALCIUM", "AGAP", "EGFR"  Imaging Studies: I personally reviewed relevant reports  EKG, Pathology, and Other Studies: I personally reviewed relevant reports    Counseling / Coordination of Care    Total floor / unit time spent today 45+ minutes. Greater than 50% of total time was spent with the patient and / or family counseling and / or coordination of care.  A description of the counseling / coordination of care: chart review, brief pt assessment, med review, phone call to family, discussion with CM.

## 2023-11-08 PROCEDURE — 88112 CYTOPATH CELL ENHANCE TECH: CPT | Performed by: PATHOLOGY

## 2023-11-08 PROCEDURE — 88305 TISSUE EXAM BY PATHOLOGIST: CPT | Performed by: PATHOLOGY

## 2023-11-08 PROCEDURE — 99231 SBSQ HOSP IP/OBS SF/LOW 25: CPT | Performed by: INTERNAL MEDICINE

## 2023-11-08 PROCEDURE — 99232 SBSQ HOSP IP/OBS MODERATE 35: CPT | Performed by: INTERNAL MEDICINE

## 2023-11-08 RX ADMIN — MELATONIN 3 MG: at 21:46

## 2023-11-08 NOTE — PROGRESS NOTES
[facility] - Progress Note  Tyree Schroeder 66 y.o. male MRN: [id number]  Unit/Bed#: -01 Encounter: [id number]      ASSESSMENT & PLAN:    65 y/o male w decompensated AIH vs alcohol induced cirrhosis db ascites, HE. Decompensated Cirrhosis  MELD 3.0: 17 at 11/4/2023  3:57 AM  MELD-Na: 17 at 11/4/2023  3:57 AM  Calculated from:  Serum Creatinine: 0.70 mg/dL (Using min of 1 mg/dL) at 11/4/2023  3:57 AM  Serum Sodium: 134 mmol/L at 11/4/2023  3:57 AM  Total Bilirubin: 2.27 mg/dL at 11/4/2023  3:57 AM  Serum Albumin: 2.7 g/dL at 11/4/2023  3:57 AM  INR(ratio): 1.54 at 11/2/2023 10:02 AM  Age at listing (hypothetical): 58 years  Sex: Male at 11/4/2023  3:57 AM  EV  Outpt EGD  HE  Lactulose titrated to 3-4 BMs/day  If refusing, agree w lactulose enemas  Ascites  Weekly paracenteses needed based on multiple delia required this hospitalizaiton w 19 and 17 L removed  Refusing diuresis   Recommend GOC discussion w family    No additional inpt GI recommendations at this time. Please call us back if additional questions or concerns. ______________________________________________________________________    SUBJECTIVE:     No acute events.      Scheduled Meds:  Current Facility-Administered Medications   Medication Dose Route Frequency Provider Last Rate    bisacodyl  10 mg Rectal Daily PRN Hansel Leak, MD      enoxaparin  40 mg Subcutaneous Daily Hansel Leak, MD      folic acid  1 mg Oral Daily Hansel Leak, MD      lactulose  20 g Oral Daily Greg Lopez MD      melatonin  3 mg Oral HS Jacqueline Manning DO      ondansetron  4 mg Intravenous Q6H PRN Hansel Leak, MD      polyethylene glycol  17 g Oral Daily Hansel Leak, MD      spironolactone  50 mg Oral Daily Greg Lopez MD      thiamine  100 mg Oral Daily Hansel Zafar MD       Continuous Infusions:   PRN Meds:.  bisacodyl    ondansetron    OBJECTIVE:     Objective Blood pressure 101/61, pulse 79, temperature 97.6 °F (36.4 °C), temperature source Axillary, resp. rate 18, weight 125 kg (276 lb 6.4 oz), SpO2 100 %. There is no height or weight on file to calculate BMI. No intake or output data in the 24 hours ending 11/08/23 1026    PHYSICAL EXAM:   General Appearance: Awake and alert, in no acute distress  Abdomen: Soft, distended; no masses or no organomegaly    Invasive Devices       Peripheral Intravenous Line  Duration             Peripheral IV 11/06/23 Dorsal (posterior); Left Hand 2 days                    LAB RESULTS:      Lab Units 11/06/23  0145 11/05/23  0525 11/04/23  0357 11/03/23  2158 11/03/23  0511   SODIUM mmol/L 137 135 134* 134* 134*   POTASSIUM mmol/L 4.0 3.8 3.4* 3.7 3.8   CHLORIDE mmol/L 101 99 97 99 99   CO2 mmol/L 34* 33* 30 31 28   BUN mg/dL 8 8 10 10 9   CREATININE mg/dL 0.65 0.67 0.70 0.75 0.64   GLUCOSE RANDOM mg/dL 113 95 99 94 71   CALCIUM mg/dL 7.9* 8.1* 8.1* 8.1* 7.9*   MAGNESIUM mg/dL  --  1.9 1.9  --  1.7*   PHOSPHORUS mg/dL  --  3.2  --   --  3.4            Lab Units 11/05/23  0525 11/04/23  0357 11/03/23  0511 11/02/23  0937   TOTAL PROTEIN g/dL 6.4 6.4 6.5 7.4   ALBUMIN g/dL 2.5* 2.7* 1.9* 2.3*   TOTAL BILIRUBIN mg/dL 1.84* 2.27* 2.07* 2.37*   BILIRUBIN DIRECT mg/dL  --   --   --  1.02*   AST U/L 33 26 25 25   ALT U/L 4* <3* <3* 5*   ALK PHOS U/L 82 64 75 94           Lab Units 11/06/23  0145 11/05/23  0525 11/04/23  0357 11/04/23  0153 11/03/23  0511 11/02/23  0937   WBC Thousand/uL 7.58 7.51 4.59  --  5.48 6.92   HEMOGLOBIN g/dL 9.2* 9.5* 8.1* 7.8* 8.9* 10.3*   HEMATOCRIT % 29.8* 30.5* 25.4* 23.8* 27.7* 32.2*   PLATELETS Thousands/uL 157 171 141*  --  166 233   MCV fL 107* 106* 106*  --  105* 104*       No results found for: "IRON", "TIBC", "FERRITIN"    Lab Results   Component Value Date    INR 1.54 (H) 11/02/2023    PROTIME 18.2 (H) 11/02/2023       RADIOLOGY RESULTS:   No results found.   Narrative/Impressions - 3 day look back     Jaye Gonzalezsins Arie Salgado M.D.  PGY-5 Gastroenterology Fellow  [facility]  Available on [email or web address]

## 2023-11-08 NOTE — CASE MANAGEMENT
Case Management Discharge Planning Note    Patient name Manuel Ann  Location [address] [facility] [phone] MRN [id number]  : 1965 Date 2023       Current Admission Date: 2023  Current Admission Diagnosis:Decompensated hepatic cirrhosis [facility])   Patient Active Problem List    Diagnosis Date Noted    Anemia 2023    Class 2 obesity in adult 2023    Decompensated hepatic cirrhosis ([address]) 2023    Constipation 2023    Other ascites 2023      LOS (days): 6  Geometric Mean LOS (GMLOS) (days): 3.30  Days to GMLOS:-2.7     OBJECTIVE:  Risk of Unplanned Readmission Score: 10.29         Current admission status: Inpatient   Preferred Pharmacy:   [address] [place] [address]  No address on file      Primary Care Provider: No primary care provider on file.     Primary Insurance: [address] [employer] [facility]  Secondary Insurance:     DISCHARGE DETAILS:    Discharge planning discussed with[de-identified] Left VM for  patients mother Tasha Plume  Freedom of Choice: Yes        Treatment Team Recommendation: Short Term Rehab  Discharge Destination Plan[de-identified] Short Term Rehab

## 2023-11-08 NOTE — PLAN OF CARE
Problem: Prexisting or High Potential for Compromised Skin Integrity  Goal: Skin integrity is maintained or improved  Description: INTERVENTIONS:  - Identify patients at risk for skin breakdown  - Assess and monitor skin integrity  - Assess and monitor nutrition and hydration status  - Monitor labs   - Assess for incontinence   - Turn and reposition patient  - Assist with mobility/ambulation  - Relieve pressure over bony prominences  - Avoid friction and shearing  - Provide appropriate hygiene as needed including keeping skin clean and dry  - Evaluate need for skin moisturizer/barrier cream  - Collaborate with interdisciplinary team   - Patient/family teaching  - Consider wound care consult   Outcome: Progressing     Problem: Nutrition/Hydration-ADULT  Goal: Nutrient/Hydration intake appropriate for improving, restoring or maintaining nutritional needs  Description: Monitor and assess patient's nutrition/hydration status for malnutrition. Collaborate with interdisciplinary team and initiate plan and interventions as ordered. Monitor patient's weight and dietary intake as ordered or per policy. Utilize nutrition screening tool and intervene as necessary. Determine patient's food preferences and provide high-protein, high-caloric foods as appropriate.      INTERVENTIONS:  - Monitor oral intake, urinary output, labs, and treatment plans  - Assess nutrition and hydration status and recommend course of action  - Evaluate amount of meals eaten  - Assist patient with eating if necessary   - Allow adequate time for meals  - Recommend/ encourage appropriate diets, oral nutritional supplements, and vitamin/mineral supplements  - Order, calculate, and assess calorie counts as needed  - Recommend, monitor, and adjust tube feedings and TPN/PPN based on assessed needs  - Assess need for intravenous fluids  - Provide specific nutrition/hydration education as appropriate  - Include patient/family/caregiver in decisions related to nutrition  Outcome: Not Progressing     Problem: Knowledge Deficit  Goal: Patient/family/caregiver demonstrates understanding of disease process, treatment plan, medications, and discharge instructions  Description: Complete learning assessment and assess knowledge base.   Interventions:  - Provide teaching at level of understanding  - Provide teaching via preferred learning methods  Outcome: Not Progressing

## 2023-11-08 NOTE — ARC ADMISSION
Patient has been declined for [facility] as patient will not tolerate aggressive program.  Recommending slower paced therapy at this time. CM has been updated in [facility].

## 2023-11-08 NOTE — BRIEF OP NOTE (RAD/CATH)
IR PARACENTESIS Procedure Note    PATIENT NAME: Dalia Lisa  : 1965  MRN: [id number]    Pre-op Diagnosis:   1. Ascites of liver    2. Orthopnea    3. Hypoxia    4. Abdominal distention    5. Decompensated hepatic cirrhosis (HCC)      Post-op Diagnosis:   1. Ascites of liver    2. Orthopnea    3. Hypoxia    4. Abdominal distention    5.  Decompensated hepatic cirrhosis ([address])        Surgeon:   FATUMA Bowden  Assistants:     No qualified resident was available, Resident is only observing    Estimated Blood Loss: None    Findings: 16,450 ml clear yellow ascites fluid from LLQ    Specimens: None    Complications:  None immediate    Anesthesia: local    FATUMA Bowden     Date: 2023  Time: 7:58 AM

## 2023-11-08 NOTE — PROGRESS NOTES
Progress note - Palliative and Supportive Care   Lida Mensah 66 y.o. male [id number]    Patient Active Problem List   Diagnosis    Class 2 obesity in adult    Decompensated hepatic cirrhosis (HCC)    Constipation    Other ascites    Anemia     Active issues specifically addressed today include: establishing contact with patient's mother Lillie Hess who is legal decision maker    Plan:  1. Symptom management -Per primary team       2. Goals - Establish contact with Mother to facilitate family meeting and determine plan for care moving forward        Code Status: Full Code - Level 1   Decisional apparatus:  Patient is not competent on my exam today. If competence is lost, patient's substitute decision maker would default to Mother Lillie Hess) by [place]. Advance Directive / Living Will / POLST:  No    NARRATIVE AND INTERVAL HISTORY:       Patient is unchanged since yesterday's evaluation. He was unwilling to consent to discussion or physical exam today, repeating "no" to any questions asking if we could proceed and then immediately falling back asleep. Left voice message on phone of patient's mother requesting a call back to discuss arranging an in person family meeting. Also spoke to patients brother on phone to ask for his help in reaching out to their mother to arrange this meeting.      MEDICATIONS / ALLERGIES:     all current active meds have been reviewed, current meds:   Current Facility-Administered Medications   Medication Dose Route Frequency    bisacodyl (DULCOLAX) rectal suppository 10 mg  10 mg Rectal Daily PRN    enoxaparin (LOVENOX) subcutaneous injection 40 mg  40 mg Subcutaneous Daily    folic acid (FOLVITE) tablet 1 mg  1 mg Oral Daily    lactulose oral solution 20 g  20 g Oral Daily    melatonin tablet 3 mg  3 mg Oral HS    ondansetron (ZOFRAN) injection 4 mg  4 mg Intravenous Q6H PRN    polyethylene glycol (MIRALAX) packet 17 g  17 g Oral Daily    spironolactone (ALDACTONE) tablet 50 mg  50 mg Oral Daily    thiamine tablet 100 mg  100 mg Oral Daily   , and PTA meds:   None       No Known Allergies    OBJECTIVE:    Physical Exam  Physical Exam  Vitals reviewed. Constitutional:       General: He is not in acute distress. Appearance: He is ill-appearing. He is not diaphoretic. HENT:      Head: Normocephalic and atraumatic. Right Ear: External ear normal.      Left Ear: External ear normal.      Nose: Nose normal.   Eyes:      General: Scleral icterus present. Pulmonary:      Effort: Pulmonary effort is normal. No respiratory distress. Abdominal:      General: There is distension. Skin:     General: Skin is warm and dry. Coloration: Skin is jaundiced and pale. Neurological:      Mental Status: He is disoriented. Lab Results: I have personally reviewed pertinent labs. , CBC: No results found for: "WBC", "HGB", "HCT", "MCV", "PLT", "ADJUSTEDWBC", "RBC", "MCH", "MCHC", "RDW", "MPV", "NRBC", CMP: No results found for: "SODIUM", "K", "CL", "CO2", "ANIONGAP", "BUN", "CREATININE", "GLUCOSE", "CALCIUM", "AST", "ALT", "ALKPHOS", "PROT", "BILITOT", "EGFR", BMP:No results found for: "SODIUM", "K", "CL", "CO2", "BUN", "CREATININE", "GLUC", "GLUF", "CALCIUM", "AGAP", "EGFR", PT/PTT:No results found for: "PT", "PTT"  Imaging Studies: RIGHT UPPER QUADRANT ULTRASOUND     INDICATION:    abd distention, eval for ascities. COMPARISON:  None. TECHNIQUE:   Real-time Limited ultrasound of the abdomen was performed with a curvilinear transducer with both volumetric sweeps and still imaging techniques. FINDINGS:     ASCITES:   Present in all 4 quadrants. IMPRESSION:     Ascites visible in all 4 quadrants. Workstation performed: [id number]    EKG, Pathology, and Other Studies: Status: Final result       Visible to patient: No (inaccessible in [address] [place] [address])       Next appt: None    Specimen Information: Paracentesis;  Body Fluid   0 Result Notes  Body Fluid Culture, Sterile No growth GRAM STAIN RESULT No Polys or Bacteria seen                    Specimen Collected: 11/03/23  2:33 PM Last Resulted: 11/06/23  7:31 AM                 Counseling / Coordination of Care    Total floor / unit time spent today 10 minutes. Greater than 50% of total time was spent with the patient and / or family counseling and / or coordination of care. A description of the counseling / coordination of care: Chart review, patient interview/discussion, psychosocial support, comprehensive symptom evaluation, physical examination, medication evaluation/alteration, advance care/goals-of-care planning, and multidisciplinary collaboration.

## 2023-11-08 NOTE — PROGRESS NOTES
INTERNAL MEDICINE RESIDENCY PROGRESS NOTE     Name: Michael Haines   Age & Sex: 66 y.o. male   MRN: [id number]  Unit/Bed#: -01   Encounter: [id number]  Team: SOD Team C     PATIENT INFORMATION     Name: Michael Haines   Age & Sex: 66 y.o. male   MRN: [id number]  Hospital Stay Days: 6    ASSESSMENT/PLAN     Principal Problem:    Decompensated hepatic cirrhosis ([address])  Active Problems:    Constipation    Other ascites    Anemia      Anemia  Assessment & Plan  Patient found to be anemic on presentation - hemoglobin improved to 9.5 following paracentesis. Previous iron panels notable for low normal iron, markedly low transferrin, elevated ferritin. No overt signs of bleeding at this time. Plan  Continue to monitor, especially following further paracenteses  Will consider repeat iron studies in the future    Other ascites  Assessment & Plan  See plan for "decompensated hepatic cirrhosis"    Constipation  Assessment & Plan  Patient endorsing constipation on admission, likely in setting of profound abdominal distention and ascites. He continues to endorse ongoing constipation even following paracentesis. Patient previously on lactulose. Of note, patient now with asterixis on exam this morning. Patient refusing lactulose since 11/5. Consider lactulose enema. Plan:  Continue lactulose therapy, can consider lactulose retention enema as well; has been refusing.   Titrate to 3-4 bowel movements daily  Miralax/Dulcolax otherwise      * Decompensated hepatic cirrhosis (HCC)  Assessment & Plan  MELD 3.0: 17 at 11/4/2023  3:57 AM  MELD-Na: 17 at 11/4/2023  3:57 AM  Calculated from:  Serum Creatinine: 0.70 mg/dL (Using min of 1 mg/dL) at 11/4/2023  3:57 AM  Serum Sodium: 134 mmol/L at 11/4/2023  3:57 AM  Total Bilirubin: 2.27 mg/dL at 11/4/2023  3:57 AM  Serum Albumin: 2.7 g/dL at 11/4/2023  3:57 AM  INR(ratio): 1.54 at 11/2/2023 10:02 AM  Age at listing (hypothetical): 58 years  Sex: Male at 11/4/2023  3:57 AM Patient currently presenting with decompensated cirrhosis with marked ascites and encephalopathy. 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 However, patient's mother also revealed a significant history of alcohol abuse. Likely dual etiology. Patient has not had paracentesis since May 2023 and has not followed up with outpatient. S/p paracentesis on 11/3 with removal of 19 L. Partially repleted with albumin, although patient refused full dose. SAAG<1.1, fluid without bacterial growth as of 11/6. S/p paracentesis on 11/6 with removal of 16.45L. Not repleted with albumin, but BP has been stable. Continue to monitor vitals with concern for infection. Patient continues to present with encephalopathy. Not mentating at baseline. Asterixis on exam. Reduced communication and responsiveness during examination. Patient has refused all meds since 11/5 and has not had a bowel movement in several days. Concern for rising ammonia levels and increased encephalopathy. May need to utilize lactulose enema if PO continues to be refused by patient. Considering liver biopsy in the future, also need to evaluate for [address] Plan:  Consider lactulose enema  Liver biopsy once paracentesis completed; dedicated imaging to evaluate for [address]  Continue thiamine, folic acid   GI consulted, appreciate recs   Continue 5:2 ratio of aldactone/lasix   Monitor outputs  Sodium and fluid restricted diet, ensure protein supplements  Consider serial paracenteses due to rapid fluid build up in abdomen  6-8g/liter albumin replacement for paracentesis > 5L  Given overall poor insight into condition and reluctance for appropriate therapy, will obtain neuropsych eval; per discussion with his mother, no official power of  currently established        Disposition: inpatient; med/surg, pending [facility] discussion    SUBJECTIVE     Patient seen and examined.  No acute events overnight. Patient remains confused this morning, still refusing all p.o. medications and morning lab work. Seen by palliative care yesterday. OBJECTIVE     Vitals:    23 0004 2300 23   BP: 110/65  101/61    Pulse: 84  79    Resp:       Temp:   (!) 95.7 °F (35.4 °C) 97.6 °F (36.4 °C)   TempSrc:    Axillary   SpO2: 99%  100% 100%   Weight:  125 kg (276 lb 6.4 oz)        Temperature:   Temp (24hrs), Av.6 °F (36.4 °C), Min:97.6 °F (36.4 °C), Max:97.6 °F (36.4 °C)    Temperature: 97.6 °F (36.4 °C)  Intake & Output:  I/O             P. O. 0      Total Intake(mL/kg) 0 (0)      Urine (mL/kg/hr) 100 (0)      Other 53921      Total Output 03593      Net -[id number]                   Weights: There is no height or weight on file to calculate BMI. Weight (last 2 days)       Date/Time Weight    23 06 125 (276.4)          Physical Exam  Constitutional:       Comments: Patient is somnolent, minimally responsive to voice, localizes to pain. HENT:      Head: Normocephalic and atraumatic. Right Ear: External ear normal.      Left Ear: External ear normal.      Nose: Nose normal.      Mouth/Throat:      Mouth: Mucous membranes are dry. Pharynx: Oropharynx is clear. Eyes:      General: Scleral icterus present. Pupils: Pupils are equal, round, and reactive to light. Cardiovascular:      Rate and Rhythm: Normal rate and regular rhythm. Pulses: Normal pulses. Heart sounds: Normal heart sounds. Pulmonary:      Effort: Pulmonary effort is normal.   Abdominal:      General: There is distension. Palpations: Abdomen is soft. Tenderness: There is no guarding or rebound. Comments: Abdominal distention, no significant tenderness throughout. Musculoskeletal:      Cervical back: Normal range of motion and neck supple. Right lower leg: No edema.       Left lower leg: No edema. Skin:     General: Skin is warm. Neurological:      Mental Status: He is disoriented. Comments: Not following commands. LABORATORY DATA     Labs: I have personally reviewed pertinent reports. Results from last 7 days   Lab Units 11/06/23 0145 11/05/23 0525 11/04/23 0357 11/04/23  0153 11/03/23  0511 11/02/23  0937   WBC Thousand/uL 7.58 7.51 4.59  --  5.48 6.92   HEMOGLOBIN g/dL 9.2* 9.5* 8.1*   < > 8.9* 10.3*   HEMATOCRIT % 29.8* 30.5* 25.4*   < > 27.7* 32.2*   PLATELETS Thousands/uL 157 171 141*  --  166 233   NEUTROS PCT %  --   --  55  --  62 69   MONOS PCT %  --   --  16*  --  17* 14*   MONO PCT %  --  12  --   --   --   --    EOS PCT %  --  4 4  --  3 3    < > = values in this interval not displayed. Results from last 7 days   Lab Units 11/06/23 0145 11/05/23 0525 11/04/23 0357 11/03/23  2158 11/03/23  0511   POTASSIUM mmol/L 4.0 3.8 3.4*   < > 3.8   CHLORIDE mmol/L 101 99 97   < > 99   CO2 mmol/L 34* 33* 30   < > 28   BUN mg/dL 8 8 10   < > 9   CREATININE mg/dL 0.65 0.67 0.70   < > 0.64   CALCIUM mg/dL 7.9* 8.1* 8.1*   < > 7.9*   ALK PHOS U/L  --  82 64  --  75   ALT U/L  --  4* <3*  --  <3*   AST U/L  --  33 26  --  25    < > = values in this interval not displayed. Results from last 7 days   Lab Units 11/05/23 0525 11/04/23 0357 11/03/23 0511   MAGNESIUM mg/dL 1.9 1.9 1.7*     Results from last 7 days   Lab Units 11/05/23 0525 11/03/23 0511   PHOSPHORUS mg/dL 3.2 3.4      Results from last 7 days   Lab Units 11/02/23  1002   INR  1.54*   PTT seconds 45*               IMAGING & DIAGNOSTIC TESTING     Radiology Results: I have personally reviewed pertinent reports. US abdomen limited    Result Date: 11/2/2023  Impression: Ascites visible in all 4 quadrants. Workstation performed: [id number]     Other Diagnostic Testing: I have personally reviewed pertinent reports.     ACTIVE MEDICATIONS     Current Facility-Administered Medications   Medication Dose Route Frequency    bisacodyl (DULCOLAX) rectal suppository 10 mg  10 mg Rectal Daily PRN    enoxaparin (LOVENOX) subcutaneous injection 40 mg  40 mg Subcutaneous Daily    folic acid (FOLVITE) tablet 1 mg  1 mg Oral Daily    lactulose oral solution 20 g  20 g Oral Daily    melatonin tablet 3 mg  3 mg Oral HS    ondansetron (ZOFRAN) injection 4 mg  4 mg Intravenous Q6H PRN    polyethylene glycol (MIRALAX) packet 17 g  17 g Oral Daily    spironolactone (ALDACTONE) tablet 50 mg  50 mg Oral Daily    thiamine tablet 100 mg  100 mg Oral Daily       VTE Pharmacologic Prophylaxis: Enoxaparin (Lovenox)  VTE Mechanical Prophylaxis: sequential compression device    Portions of the record may have been created with voice recognition software. Occasional wrong word or "sound a like" substitutions may have occurred due to the inherent limitations of voice recognition software. Read the chart carefully and recognize, using context, where substitutions have occurred.   ==  Gena Gamboa MD  [id number]  Internal Medicine Residency PGY-1

## 2023-11-08 NOTE — QUICK NOTE
Patient's brother Kamala Carvajal and mother Maxine Pate at bedside to discuss goals of care. Spoke at length regarding patient's current condition, prognosis and repeated refusal of treatments. We explained that he does not currently have capacity to make decisions, however we would like to respect his wishes and those of his family regarding any further treatment. Discussed that were he to pursue treatment, it would include daily lactulose, likely intensive treatment with PT, possibly weekly paracenteses and consistent follow-up with PCP/GI. Additionally discussed that he would likely refuse and possibly resist any further attempts at giving lactulose, and family agreed that the use of restraints would not be in line with his wishes or theirs. Broached the possibility of pursuing hospice care, whether that be at a hospice facility or home hospice. His family said they would like to have some time to discuss this further, and come to a decision - they were in agreement that he would want to go home. We reassured them we would keep them appraised of any updates and also speak with palliative care again tomorrow. We will also discuss with case management and look into what options are available regarding hospice care.

## 2023-11-08 NOTE — PLAN OF CARE
Problem: MOBILITY - ADULT  Goal: Maintain or return to baseline ADL function  Description: INTERVENTIONS:  -  Assess patient's ability to carry out ADLs; assess patient's baseline for ADL function and identify physical deficits which impact ability to perform ADLs (bathing, care of mouth/teeth, toileting, grooming, dressing, etc.)  - Assess/evaluate cause of self-care deficits   - Assess range of motion  - Assess patient's mobility; develop plan if impaired  - Assess patient's need for assistive devices and provide as appropriate  - Encourage maximum independence but intervene and supervise when necessary  - Involve family in performance of ADLs  - Assess for home care needs following discharge   - Consider OT consult to assist with ADL evaluation and planning for discharge  - Provide patient education as appropriate  Outcome: Progressing     Problem: INFECTION - ADULT  Goal: Absence or prevention of progression during hospitalization  Description: INTERVENTIONS:  - Assess and monitor for signs and symptoms of infection  - Monitor lab/diagnostic results  - Monitor all insertion sites, i.e. indwelling lines, tubes, and drains  - Monitor endotracheal if appropriate and nasal secretions for changes in amount and color  - [place] appropriate cooling/warming therapies per order  - Administer medications as ordered  - Instruct and encourage patient and family to use good hand hygiene technique  - Identify and instruct in appropriate isolation precautions for identified infection/condition  Outcome: Progressing     Problem: SAFETY ADULT  Goal: Maintain or return to baseline ADL function  Description: INTERVENTIONS:  -  Assess patient's ability to carry out ADLs; assess patient's baseline for ADL function and identify physical deficits which impact ability to perform ADLs (bathing, care of mouth/teeth, toileting, grooming, dressing, etc.)  - Assess/evaluate cause of self-care deficits   - Assess range of motion  - Assess patient's mobility; develop plan if impaired  - Assess patient's need for assistive devices and provide as appropriate  - Encourage maximum independence but intervene and supervise when necessary  - Involve family in performance of ADLs  - Assess for home care needs following discharge   - Consider OT consult to assist with ADL evaluation and planning for discharge  - Provide patient education as appropriate  Outcome: Progressing

## 2023-11-09 LAB — GLUCOSE SERPL-MCNC: 71 MG/DL (ref 65–140)

## 2023-11-09 PROCEDURE — 99232 SBSQ HOSP IP/OBS MODERATE 35: CPT | Performed by: INTERNAL MEDICINE

## 2023-11-09 PROCEDURE — 99231 SBSQ HOSP IP/OBS SF/LOW 25: CPT | Performed by: INTERNAL MEDICINE

## 2023-11-09 PROCEDURE — 82948 REAGENT STRIP/BLOOD GLUCOSE: CPT

## 2023-11-09 NOTE — PLAN OF CARE
Problem: Prexisting or High Potential for Compromised Skin Integrity  Goal: Skin integrity is maintained or improved  Description: INTERVENTIONS:  - Identify patients at risk for skin breakdown  - Assess and monitor skin integrity  - Assess and monitor nutrition and hydration status  - Monitor labs   - Assess for incontinence   - Turn and reposition patient  - Assist with mobility/ambulation  - Relieve pressure over bony prominences  - Avoid friction and shearing  - Provide appropriate hygiene as needed including keeping skin clean and dry  - Evaluate need for skin moisturizer/barrier cream  - Collaborate with interdisciplinary team   - Patient/family teaching  - Consider wound care consult   Outcome: Progressing     Problem: MOBILITY - ADULT  Goal: Maintain or return to baseline ADL function  Description: INTERVENTIONS:  -  Assess patient's ability to carry out ADLs; assess patient's baseline for ADL function and identify physical deficits which impact ability to perform ADLs (bathing, care of mouth/teeth, toileting, grooming, dressing, etc.)  - Assess/evaluate cause of self-care deficits   - Assess range of motion  - Assess patient's mobility; develop plan if impaired  - Assess patient's need for assistive devices and provide as appropriate  - Encourage maximum independence but intervene and supervise when necessary  - Involve family in performance of ADLs  - Assess for home care needs following discharge   - Consider OT consult to assist with ADL evaluation and planning for discharge  - Provide patient education as appropriate  Outcome: Progressing  Goal: Maintains/Returns to pre admission functional level  Description: INTERVENTIONS:  - Perform BMAT or MOVE assessment daily.   - Set and communicate daily mobility goal to care team and patient/family/caregiver. - Collaborate with rehabilitation services on mobility goals if consulted  - Perform Range of Motion  times a day.   - Reposition patient every hours.  - Dangle patient  times a day  - Stand patient  times a day  - Ambulate patient  times a day  - Out of bed to chair  times a day   - Out of bed for meals  times a day  - Out of bed for toileting  - Record patient progress and toleration of activity level   Outcome: Progressing     Problem: PAIN - ADULT  Goal: Verbalizes/displays adequate comfort level or baseline comfort level  Description: Interventions:  - Encourage patient to monitor pain and request assistance  - Assess pain using appropriate pain scale  - Administer analgesics based on type and severity of pain and evaluate response  - Implement non-pharmacological measures as appropriate and evaluate response  - Consider cultural and social influences on pain and pain management  - Notify physician/advanced practitioner if interventions unsuccessful or patient reports new pain  Outcome: Progressing     Problem: INFECTION - ADULT  Goal: Absence or prevention of progression during hospitalization  Description: INTERVENTIONS:  - Assess and monitor for signs and symptoms of infection  - Monitor lab/diagnostic results  - Monitor all insertion sites, i.e. indwelling lines, tubes, and drains  - Monitor endotracheal if appropriate and nasal secretions for changes in amount and color  - [place] appropriate cooling/warming therapies per order  - Administer medications as ordered  - Instruct and encourage patient and family to use good hand hygiene technique  - Identify and instruct in appropriate isolation precautions for identified infection/condition  Outcome: Progressing  Goal: Absence of fever/infection during neutropenic period  Description: INTERVENTIONS:  - Monitor WBC    Outcome: Progressing     Problem: SAFETY ADULT  Goal: Maintain or return to baseline ADL function  Description: INTERVENTIONS:  -  Assess patient's ability to carry out ADLs; assess patient's baseline for ADL function and identify physical deficits which impact ability to perform ADLs (bathing, care of mouth/teeth, toileting, grooming, dressing, etc.)  - Assess/evaluate cause of self-care deficits   - Assess range of motion  - Assess patient's mobility; develop plan if impaired  - Assess patient's need for assistive devices and provide as appropriate  - Encourage maximum independence but intervene and supervise when necessary  - Involve family in performance of ADLs  - Assess for home care needs following discharge   - Consider OT consult to assist with ADL evaluation and planning for discharge  - Provide patient education as appropriate  Outcome: Progressing  Goal: Maintains/Returns to pre admission functional level  Description: INTERVENTIONS:  - Perform BMAT or MOVE assessment daily.   - Set and communicate daily mobility goal to care team and patient/family/caregiver. - Collaborate with rehabilitation services on mobility goals if consulted  - Perform Range of Motion  times a day. - Reposition patient every  hours.   - Dangle patient  times a day  - Stand patient  times a day  - Ambulate patient  times a day  - Out of bed to chair  times a day   - Out of bed for meals  times a day  - Out of bed for toileting  - Record patient progress and toleration of activity level   Outcome: Progressing  Goal: Patient will remain free of falls  Description: INTERVENTIONS:  - Educate patient/family on patient safety including physical limitations  - Instruct patient to call for assistance with activity   - Consult OT/PT to assist with strengthening/mobility   - Keep Call bell within reach  - Keep bed low and locked with side rails adjusted as appropriate  - Keep care items and personal belongings within reach  - Initiate and maintain comfort rounds  - Make Fall Risk Sign visible to staff  - Offer Toileting every Hours, in advance of need  - Initiate/Maintain alarm  - Obtain necessary fall risk management equipment:   - Apply yellow socks and bracelet for high fall risk patients  - Consider moving patient to room near nurses station  Outcome: Progressing     Problem: DISCHARGE PLANNING  Goal: Discharge to home or other facility with appropriate resources  Description: INTERVENTIONS:  - Identify barriers to discharge w/patient and caregiver  - Arrange for needed discharge resources and transportation as appropriate  - Identify discharge learning needs (meds, wound care, etc.)  - Arrange for interpretive services to assist at discharge as needed  - Refer to Case Management Department for coordinating discharge planning if the patient needs post-hospital services based on physician/advanced practitioner order or complex needs related to functional status, cognitive ability, or social support system  Outcome: Progressing     Problem: Knowledge Deficit  Goal: Patient/family/caregiver demonstrates understanding of disease process, treatment plan, medications, and discharge instructions  Description: Complete learning assessment and assess knowledge base. Interventions:  - Provide teaching at level of understanding  - Provide teaching via preferred learning methods  Outcome: Progressing     Problem: Nutrition/Hydration-ADULT  Goal: Nutrient/Hydration intake appropriate for improving, restoring or maintaining nutritional needs  Description: Monitor and assess patient's nutrition/hydration status for malnutrition. Collaborate with interdisciplinary team and initiate plan and interventions as ordered. Monitor patient's weight and dietary intake as ordered or per policy. Utilize nutrition screening tool and intervene as necessary. Determine patient's food preferences and provide high-protein, high-caloric foods as appropriate.      INTERVENTIONS:  - Monitor oral intake, urinary output, labs, and treatment plans  - Assess nutrition and hydration status and recommend course of action  - Evaluate amount of meals eaten  - Assist patient with eating if necessary   - Allow adequate time for meals  - Recommend/ encourage appropriate diets, oral nutritional supplements, and vitamin/mineral supplements  - Order, calculate, and assess calorie counts as needed  - Recommend, monitor, and adjust tube feedings and TPN/PPN based on assessed needs  - Assess need for intravenous fluids  - Provide specific nutrition/hydration education as appropriate  - Include patient/family/caregiver in decisions related to nutrition  Outcome: Progressing

## 2023-11-09 NOTE — QUICK NOTE
Spoke with patient's brother, Sera Alvarez - discussion still ongoing with his mother, Jacqueline Bryan, but again expressed the patient would most likely want to be home with nursing care, and to be comfortable. Inpatient consult placed to hospice, will continue discussing goals of care with family.

## 2023-11-09 NOTE — PROGRESS NOTES
Attempted to place patient on oxygen via nasal cannula for sustained oxygen levels from 83-85%. Patient stated "I don't want that and I don't like it."  Explained to patient need for oxygen to keep levels at least 90 % and patient's level is low in the mid-80's. Patient stated "I don't care and feel fine.   Don't put oxygen on me."

## 2023-11-09 NOTE — PHYSICAL THERAPY NOTE
PHYSICAL THERAPY NOTE          Patient Name: Garold Mcardle  IPXVY'M Date: 11/9/2023 11/09/23 1221   PT Last Visit   PT Visit Date 11/09/23   Note Type   Note type Cancelled Session   Cancel Reasons Refusal       PT will continue to follow as able.     Shirin Smith, PT, DPT

## 2023-11-09 NOTE — PLAN OF CARE
Problem: Prexisting or High Potential for Compromised Skin Integrity  Goal: Skin integrity is maintained or improved  Description: INTERVENTIONS:  - Identify patients at risk for skin breakdown  - Assess and monitor skin integrity  - Assess and monitor nutrition and hydration status  - Monitor labs   - Assess for incontinence   - Turn and reposition patient  - Assist with mobility/ambulation  - Relieve pressure over bony prominences  - Avoid friction and shearing  - Provide appropriate hygiene as needed including keeping skin clean and dry  - Evaluate need for skin moisturizer/barrier cream  - Collaborate with interdisciplinary team   - Patient/family teaching  - Consider wound care consult   Outcome: Progressing     Problem: Knowledge Deficit  Goal: Patient/family/caregiver demonstrates understanding of disease process, treatment plan, medications, and discharge instructions  Description: Complete learning assessment and assess knowledge base. Interventions:  - Provide teaching at level of understanding  - Provide teaching via preferred learning methods  Outcome: Not Progressing     Problem: Nutrition/Hydration-ADULT  Goal: Nutrient/Hydration intake appropriate for improving, restoring or maintaining nutritional needs  Description: Monitor and assess patient's nutrition/hydration status for malnutrition. Collaborate with interdisciplinary team and initiate plan and interventions as ordered. Monitor patient's weight and dietary intake as ordered or per policy. Utilize nutrition screening tool and intervene as necessary. Determine patient's food preferences and provide high-protein, high-caloric foods as appropriate.      INTERVENTIONS:  - Monitor oral intake, urinary output, labs, and treatment plans  - Assess nutrition and hydration status and recommend course of action  - Evaluate amount of meals eaten  - Assist patient with eating if necessary   - Allow adequate time for meals  - Recommend/ encourage appropriate diets, oral nutritional supplements, and vitamin/mineral supplements  - Order, calculate, and assess calorie counts as needed  - Recommend, monitor, and adjust tube feedings and TPN/PPN based on assessed needs  - Assess need for intravenous fluids  - Provide specific nutrition/hydration education as appropriate  - Include patient/family/caregiver in decisions related to nutrition  Outcome: Not Progressing

## 2023-11-09 NOTE — PROGRESS NOTES
Progress note - Palliative and Supportive Care   Dalia Lisa 66 y.o. male [id number]    Patient Active Problem List   Diagnosis    Class 2 obesity in adult    Decompensated hepatic cirrhosis (HCC)    Constipation    Other ascites    Anemia     Active issues specifically addressed today include: Decompensated hepatic cirrhosis, treatment plan    Plan:  1. Symptom management - Per primary team        2. Goals - Discuss with mother plan for care        Code Status: Full code - Level 1   Decisional apparatus:  Patient is not competent on my exam today. If competence is lost, patient's substitute decision maker would default to patient's mother [place] by [place] Act 169. Advance Directive / Living Will / POLST:  none on file    NARRATIVE AND INTERVAL HISTORY:       Olga Boudreaux continues with refusal of treatments. His mother and brother came to the hospital yesterday early evening and spoke with the primary team who relayed that he has continued refusing treatment and with that will likely continue to decompensate in regards to his hepatic function. Per their note the family stated that it would be against his wishes to be restrained in order to receive treatment, and they agreed he would like to be home. This is a wish the patient has echoed on multiple occasions. This morning the patient was noted to be desaturating down to the mid 80's on his O2 monitor. He refused nasal canula. Attempted to see Dayanara Kebede today, he requested that this writer leave the room upon entering to leave him alone and did not consent to discussion or physical exam.     Mother reached over phone by Dr. Brayden Plaza on [facility] [place], operated by [facility] team who states she is interested in pursuing hospice for her son.      MEDICATIONS / ALLERGIES:     all current active meds have been reviewed, current meds:   Current Facility-Administered Medications   Medication Dose Route Frequency    bisacodyl (DULCOLAX) rectal suppository 10 mg  10 mg Rectal Daily PRN    enoxaparin (LOVENOX) subcutaneous injection 40 mg  40 mg Subcutaneous Daily    folic acid (FOLVITE) tablet 1 mg  1 mg Oral Daily    lactulose oral solution 20 g  20 g Oral Daily    melatonin tablet 3 mg  3 mg Oral HS    ondansetron (ZOFRAN) injection 4 mg  4 mg Intravenous Q6H PRN    polyethylene glycol (MIRALAX) packet 17 g  17 g Oral Daily    spironolactone (ALDACTONE) tablet 50 mg  50 mg Oral Daily    thiamine tablet 100 mg  100 mg Oral Daily   , and PTA meds:   None       No Known Allergies    OBJECTIVE:    Physical Exam  Physical Exam  Vitals and nursing note reviewed. Constitutional:       General: He is not in acute distress. Appearance: He is ill-appearing. He is not diaphoretic. HENT:      Head: Normocephalic and atraumatic. Right Ear: External ear normal.      Left Ear: External ear normal.      Nose: Nose normal.   Eyes:      General: Scleral icterus present. Musculoskeletal:      Cervical back: Normal range of motion. Skin:     Coloration: Skin is jaundiced and pale. Neurological:      Mental Status: He is disoriented. Physical exam limited by patient refusal for discussion or treatment    Lab Results: I have personally reviewed pertinent labs. No new labs since 11/6, has refused lab draws. Imaging Studies: No updated imaging  EKG, Pathology, and Other Studies: Culture and gram stain of turbid ascitic fluid from paracentesis found no growth of any bacteria. Counseling / Coordination of Care    Total floor / unit time spent today 5 minutes. Greater than 50% of total time was spent with the patient and / or family counseling and / or coordination of care.  A description of the counseling / coordination of care: Patient refused conversation or exam.

## 2023-11-09 NOTE — PROGRESS NOTES
INTERNAL MEDICINE RESIDENCY PROGRESS NOTE     Name: Mahendra Panda   Age & Sex: 66 y.o. male   MRN: [id number]  Unit/Bed#: -01   Encounter: [id number]  Team: SOD Team C     PATIENT INFORMATION     Name: Mahendra Panda   Age & Sex: 66 y.o. male   MRN: [id number]  Hospital Stay Days: 7    ASSESSMENT/PLAN     Principal Problem:    Decompensated hepatic cirrhosis ([address])  Active Problems:    Constipation    Other ascites    Anemia      * Decompensated hepatic cirrhosis (HCC)  Assessment & Plan  MELD 3.0: 17 at 11/4/2023  3:57 AM  MELD-Na: 17 at 11/4/2023  3:57 AM  Calculated from:  Serum Creatinine: 0.70 mg/dL (Using min of 1 mg/dL) at 11/4/2023  3:57 AM  Serum Sodium: 134 mmol/L at 11/4/2023  3:57 AM  Total Bilirubin: 2.27 mg/dL at 11/4/2023  3:57 AM  Serum Albumin: 2.7 g/dL at 11/4/2023  3:57 AM  INR(ratio): 1.54 at 11/2/2023 10:02 AM  Age at listing (hypothetical): 66 years  Sex: Male at 11/4/2023  3:57 AM     Patient currently presenting with decompensated cirrhosis with marked ascites and encephalopathy. 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 However, patient's mother also revealed a significant history of alcohol abuse. Likely dual etiology. Patient has not had paracentesis since May 2023 and has not followed up with outpatient. S/p paracentesis on 11/3 with removal of 19 L. Partially repleted with albumin, although patient refused full dose. SAAG<1.1, fluid without bacterial growth as of 11/6. S/p paracentesis on 11/6 with removal of 16.45L. Not repleted with albumin, but BP has been stable. Continue to monitor vitals with concern for infection. Patient continues to present with encephalopathy. Not mentating at baseline. Asterixis on exam. Reduced communication and responsiveness during examination. Patient has refused all meds since 11/5 and has not had a bowel movement in several days.  Concern for worsening encephalopathy. May need to utilize lactulose enema if PO continues to be refused by patient. If patient transitions to home hospice consider ASEPT catheter to drain ascites. Palliative care consulted. Plan:  Future decision-making led by family and palliative care; considering home hospice. Consider ASEPT catheter. Attempt to give patient aldactone/lactulose, thiamine/folate supplements. Consider lactulose enema  Monitor outputs  Sodium and fluid restricted diet, ensure protein supplements  6-8g/liter albumin replacement for paracentesis > 5L  Neuropsych eval obtained, patient lacks capacity at this time    Anemia  Assessment & Plan  Patient found to be anemic on presentation - hemoglobin improved to 9.5 following paracentesis. Previous iron panels notable for low normal iron, markedly low transferrin, elevated ferritin. No overt signs of bleeding at this time. Plan  Continue to monitor, especially following further paracenteses  Will consider repeat iron studies in the future    Other ascites  Assessment & Plan  See plan for "decompensated hepatic cirrhosis"    Constipation  Assessment & Plan  Patient endorsing constipation on admission, likely in setting of profound abdominal distention and ascites. He continues to endorse ongoing constipation even following paracentesis. Patient previously on lactulose. Of note, patient now with asterixis on exam this morning. Patient refusing lactulose since 11/5. Consider lactulose enema. Patient had 1 BM 11/8. Plan:  Palliative care consulted, future decision making by family. Considering home hospice. Lactulose therapy, can consider lactulose retention enema as well; has been refusing. Titrate to 3-4 bowel movements daily  Miralax/Dulcolax otherwise          Disposition: Patient continues to require inpatient management. Patient's family is unsure of next steps but are considering home hospice management.      SUBJECTIVE     Patient seen and examined. No acute events overnight, but the patient did desaturate to 85% O2 in the early AM. He refused O2 supplementation via NC. He continues to refuse all medications but allows physical exams. He refuses to discuss palliative care, but family may decide to transition to home hospice. Patient is still somewhat confused and encephalopathic, but slightly more alert today. Had 1 large bowel movement yesterday. Abdomen slightly more distended today. OBJECTIVE     Vitals:    23 2000 23 1454 23 0454 23 0745   BP:  113/73  118/73   Pulse:  99  98   Resp:    18   Temp: 97.6 °F (36.4 °C) 98.3 °F (36.8 °C)  98.5 °F (36.9 °C)   TempSrc: Axillary      SpO2: 100% 92% (!) 87% (!) 86%   Weight:          Temperature:   Temp (24hrs), Av.4 °F (36.9 °C), Min:98.3 °F (36.8 °C), Max:98.5 °F (36.9 °C)    Temperature: 98.5 °F (36.9 °C)  Intake & Output:  I/O          07 07 07 07 0701  11/10 0700    P. O.   0    Total Intake(mL/kg)   0 (0)    Urine (mL/kg/hr)       Other       Total Output       Net   0           Unmeasured Urine Occurrence  1 x 1 x    Unmeasured Stool Occurrence  1 x           Weights: There is no height or weight on file to calculate BMI. Weight (last 2 days)       Date/Time Weight    23 0600 125 (276.4)          Physical Exam  Constitutional:       Appearance: He is ill-appearing. Comments: Patient is somnolent, minimally responsive to voice, localizes to pain. HENT:      Head: Normocephalic and atraumatic. Right Ear: External ear normal.      Left Ear: External ear normal.      Nose: Nose normal.      Mouth/Throat:      Mouth: Mucous membranes are dry. Pharynx: Oropharynx is clear. Eyes:      General: Scleral icterus present. Pupils: Pupils are equal, round, and reactive to light. Cardiovascular:      Rate and Rhythm: Normal rate and regular rhythm. Pulses: Normal pulses.       Heart sounds: Normal heart sounds. Pulmonary:      Effort: Pulmonary effort is normal.      Comments: Hypoxic to 85%. Breath sounds unremarkable. Abdominal:      General: There is distension. Palpations: Abdomen is soft. Tenderness: There is abdominal tenderness. There is no guarding or rebound. Comments: Abdominal distention visible. Musculoskeletal:      Cervical back: Normal range of motion and neck supple. Right lower leg: No edema. Left lower leg: No edema. Skin:     General: Skin is warm. Coloration: Skin is jaundiced. Findings: Bruising present. Neurological:      Mental Status: He is alert. He is disoriented. Comments: Not following commands. Psychiatric:         Mood and Affect: Mood normal.      Comments: Likely still encephalopathic. LABORATORY DATA     Labs: I have personally reviewed pertinent reports. Results from last 7 days   Lab Units 11/06/23 0145 11/05/23 0525 11/04/23 0357 11/04/23 0153 11/03/23 0511   WBC Thousand/uL 7.58 7.51 4.59  --  5.48   HEMOGLOBIN g/dL 9.2* 9.5* 8.1*   < > 8.9*   HEMATOCRIT % 29.8* 30.5* 25.4*   < > 27.7*   PLATELETS Thousands/uL 157 171 141*  --  166   NEUTROS PCT %  --   --  55  --  62   MONOS PCT %  --   --  16*  --  17*   MONO PCT %  --  12  --   --   --    EOS PCT %  --  4 4  --  3    < > = values in this interval not displayed. Results from last 7 days   Lab Units 11/06/23 0145 11/05/23 0525 11/04/23 0357 11/03/23 2158 11/03/23  0511   POTASSIUM mmol/L 4.0 3.8 3.4*   < > 3.8   CHLORIDE mmol/L 101 99 97   < > 99   CO2 mmol/L 34* 33* 30   < > 28   BUN mg/dL 8 8 10   < > 9   CREATININE mg/dL 0.65 0.67 0.70   < > 0.64   CALCIUM mg/dL 7.9* 8.1* 8.1*   < > 7.9*   ALK PHOS U/L  --  82 64  --  75   ALT U/L  --  4* <3*  --  <3*   AST U/L  --  33 26  --  25    < > = values in this interval not displayed.      Results from last 7 days   Lab Units 11/05/23  0525 11/04/23  0357 11/03/23  0511   MAGNESIUM mg/dL 1.9 1.9 1.7* Results from last 7 days   Lab Units 11/05/23  0525 11/03/23  0511   PHOSPHORUS mg/dL 3.2 3.4                    IMAGING & DIAGNOSTIC TESTING     Radiology Results: I have personally reviewed pertinent reports. US abdomen limited    Result Date: 11/2/2023  Impression: Ascites visible in all 4 quadrants. Workstation performed: [id number]     Other Diagnostic Testing: I have personally reviewed pertinent reports. ACTIVE MEDICATIONS     Current Facility-Administered Medications   Medication Dose Route Frequency    bisacodyl (DULCOLAX) rectal suppository 10 mg  10 mg Rectal Daily PRN    enoxaparin (LOVENOX) subcutaneous injection 40 mg  40 mg Subcutaneous Daily    folic acid (FOLVITE) tablet 1 mg  1 mg Oral Daily    lactulose oral solution 20 g  20 g Oral Daily    melatonin tablet 3 mg  3 mg Oral HS    ondansetron (ZOFRAN) injection 4 mg  4 mg Intravenous Q6H PRN    polyethylene glycol (MIRALAX) packet 17 g  17 g Oral Daily    spironolactone (ALDACTONE) tablet 50 mg  50 mg Oral Daily    thiamine tablet 100 mg  100 mg Oral Daily       VTE Pharmacologic Prophylaxis: Reason for no pharmacologic prophylaxis Lovenox SC.  VTE Mechanical Prophylaxis: sequential compression device    Portions of the record may have been created with voice recognition software. Occasional wrong word or "sound a like" substitutions may have occurred due to the inherent limitations of voice recognition software. Read the chart carefully and recognize, using context, where substitutions have occurred.   ==  Leonela Lerner MD  [address] [facility]  Internal Medicine Residency PGY-1

## 2023-11-09 NOTE — QUICK NOTE
Patient requested help sitting up for dinner. When staff began attempting to sit patient up for dinner, patient became upset and began insulting and threatening staff, stating "just wait until I get your ass", and "you're Janiya Russo pay for this you bitch". Patient refusing food and any repositioning.

## 2023-11-10 ENCOUNTER — HOME CARE VISIT (OUTPATIENT)
Dept: HOME HOSPICE | Facility: HOSPICE | Age: 58
End: 2023-11-10
Payer: MEDICARE

## 2023-11-10 ENCOUNTER — HOSPICE ADMISSION (OUTPATIENT)
Dept: HOME HOSPICE | Facility: HOSPICE | Age: 58
End: 2023-11-10
Payer: MEDICARE

## 2023-11-10 PROCEDURE — 99232 SBSQ HOSP IP/OBS MODERATE 35: CPT | Performed by: INTERNAL MEDICINE

## 2023-11-10 NOTE — PROGRESS NOTES
INTERNAL MEDICINE RESIDENCY PROGRESS NOTE     Name: Michael Haines   Age & Sex: 66 y.o. male   MRN: [id number]  Unit/Bed#: -01   Encounter: [id number]  Team: SOD Team C     PATIENT INFORMATION     Name: Michael Haines   Age & Sex: 66 y.o. male   MRN: [id number]  Hospital Stay Days: 8    ASSESSMENT/PLAN     Principal Problem:    Decompensated hepatic cirrhosis ([address])  Active Problems:    Constipation    Other ascites    Anemia      Anemia  Assessment & Plan  Patient found to be anemic on presentation - hemoglobin improved to 9.5 following paracentesis. Previous iron panels notable for low normal iron, markedly low transferrin, elevated ferritin. No overt signs of bleeding at this time. Plan  Continue to monitor, especially following further paracenteses  Will consider repeat iron studies in the future    Other ascites  Assessment & Plan  See plan for "decompensated hepatic cirrhosis"    Constipation  Assessment & Plan  Patient endorsing constipation on admission, likely in setting of profound abdominal distention and ascites. He continues to endorse ongoing constipation even following paracentesis. Patient previously on lactulose. Of note, patient now with asterixis on exam this morning. Patient refusing lactulose since 11/5. Consider lactulose enema. Patient had 1 BM 11/8. Plan:  Palliative care consulted, future decision making by family. Considering home hospice. Lactulose therapy, can consider lactulose retention enema as well; has been refusing.   Titrate to 3-4 bowel movements daily  Miralax/Dulcolax otherwise      * Decompensated hepatic cirrhosis (HCC)  Assessment & Plan  MELD 3.0: 17 at 11/4/2023  3:57 AM  MELD-Na: 17 at 11/4/2023  3:57 AM  Calculated from:  Serum Creatinine: 0.70 mg/dL (Using min of 1 mg/dL) at 11/4/2023  3:57 AM  Serum Sodium: 134 mmol/L at 11/4/2023  3:57 AM  Total Bilirubin: 2.27 mg/dL at 11/4/2023  3:57 AM  Serum Albumin: 2.7 g/dL at 11/4/2023  3:57 AM  INR(ratio): 1.54 at 11/2/2023 10:02 AM  Age at listing (hypothetical): 66 years  Sex: Male at 11/4/2023  3:57 AM     Patient currently presenting with decompensated cirrhosis with marked ascites and encephalopathy. 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 However, patient's mother also revealed a significant history of alcohol abuse. Likely dual etiology. Patient has not had paracentesis since May 2023 and has not followed up with outpatient. S/p paracentesis on 11/3 with removal of 19 L. Partially repleted with albumin, although patient refused full dose. SAAG<1.1, fluid without bacterial growth as of 11/6. S/p paracentesis on 11/6 with removal of 16.45L. Not repleted with albumin, but BP has been stable. Continue to monitor vitals with concern for infection. Patient continues to present with encephalopathy. Not mentating at baseline. Asterixis on exam. Reduced communication and responsiveness during examination. Patient has refused all meds since 11/5 and has not had a bowel movement in several days. Concern for worsening encephalopathy. May need to utilize lactulose enema if PO continues to be refused by patient. If patient transitions to home hospice consider ASEPT catheter to drain ascites. Palliative care consulted. Plan:  Disposition pending further discussion between family, primary team and palliative care; considering home hospice and code status change, discussion ongoing. Consider ASEPT catheter vs. repeated paracenteses once disposition finalized. Attempt to give patient aldactone/lactulose, thiamine/folate supplements.    Consider lactulose enema  Monitor outputs  Sodium and fluid restricted diet, ensure protein supplements  6-8g/liter albumin replacement for paracentesis > 5L  Neuropsych eval obtained, patient lacks capacity at this time        Disposition: inpatient; med/surg, discussion regarding home hospice and changing code status still ongoing. SUBJECTIVE     Patient seen and examined. No acute events overnight. Patient was reportedly uncooperative with nursing and minimally cooperative with questioning and exam today. Appears slightly more alert, was sitting up in bed and eating, but periodically dozing off. OBJECTIVE     Vitals:    23 0745 23 1519 23 2154 11/10/23 0749   BP: 118/73 121/77 123/77 117/68   BP Location:   Left arm    Pulse: 98 99 101 91   Resp: 18  18 16   Temp: 98.5 °F (36.9 °C) 98.4 °F (36.9 °C)  98.3 °F (36.8 °C)   TempSrc:       SpO2: (!) 86% 92% (!) 88% (!) 89%   Weight:          Temperature:   Temp (24hrs), Av.4 °F (36.9 °C), Min:98.3 °F (36.8 °C), Max:98.4 °F (36.9 °C)    Temperature: 98.3 °F (36.8 °C)  Intake & Output:  I/O          07 0700 11/09 0701  11/10 0700 11/10 07 0700    P. O.  150     Total Intake(mL/kg)  150 (1.2)     Net  +150            Unmeasured Urine Occurrence 1 x 3 x     Unmeasured Stool Occurrence 1 x            Weights: There is no height or weight on file to calculate BMI. Weight (last 2 days)       None          Physical Exam  Constitutional:       Appearance: He is ill-appearing. Comments: Patient is alert, awake and eating breakfast, still disoriented. Refuses to follow commands. Remainder of exam limited as patient is uncooperative. HENT:      Head: Normocephalic and atraumatic. Right Ear: External ear normal.      Left Ear: External ear normal.      Nose: Nose normal.      Mouth/Throat:      Mouth: Mucous membranes are dry. Pharynx: Oropharynx is clear. Eyes:      General: Scleral icterus present. Musculoskeletal:      Cervical back: Normal range of motion and neck supple. Right lower leg: No edema. Left lower leg: No edema. Skin:     Coloration: Skin is jaundiced. Findings: Bruising present. Neurological:      Mental Status: He is alert. He is disoriented. Comments: Not following commands. Psychiatric:      Comments: Likely still encephalopathic. LABORATORY DATA     Labs: I have personally reviewed pertinent reports. Results from last 7 days   Lab Units 11/06/23 0145 11/05/23 0525 11/04/23  0357   WBC Thousand/uL 7.58 7.51 4.59   HEMOGLOBIN g/dL 9.2* 9.5* 8.1*   HEMATOCRIT % 29.8* 30.5* 25.4*   PLATELETS Thousands/uL 157 171 141*   NEUTROS PCT %  --   --  55   MONOS PCT %  --   --  16*   MONO PCT %  --  12  --    EOS PCT %  --  4 4      Results from last 7 days   Lab Units 11/06/23 0145 11/05/23 0525 11/04/23 0357   POTASSIUM mmol/L 4.0 3.8 3.4*   CHLORIDE mmol/L 101 99 97   CO2 mmol/L 34* 33* 30   BUN mg/dL 8 8 10   CREATININE mg/dL 0.65 0.67 0.70   CALCIUM mg/dL 7.9* 8.1* 8.1*   ALK PHOS U/L  --  82 64   ALT U/L  --  4* <3*   AST U/L  --  33 26     Results from last 7 days   Lab Units 11/05/23 0525 11/04/23 0357   MAGNESIUM mg/dL 1.9 1.9     Results from last 7 days   Lab Units 11/05/23 0525   PHOSPHORUS mg/dL 3.2                    IMAGING & DIAGNOSTIC TESTING     Radiology Results: I have personally reviewed pertinent reports. IR INPATIENT Paracentesis    Result Date: 11/9/2023  Impression: Impression: Ultrasound-guided left paracentesis. Signed, performed, and dictated by FATUMA Jarvis under the direct supervision of Dr. Saeid Vela. Workstation performed: [id number]     US abdomen limited    Result Date: 11/2/2023  Impression: Ascites visible in all 4 quadrants. Workstation performed: [id number]     Other Diagnostic Testing: I have personally reviewed pertinent reports.     ACTIVE MEDICATIONS     Current Facility-Administered Medications   Medication Dose Route Frequency    bisacodyl (DULCOLAX) rectal suppository 10 mg  10 mg Rectal Daily PRN    enoxaparin (LOVENOX) subcutaneous injection 40 mg  40 mg Subcutaneous Daily    folic acid (FOLVITE) tablet 1 mg  1 mg Oral Daily    lactulose oral solution 20 g  20 g Oral Daily    melatonin tablet 3 mg  3 mg Oral HS    ondansetron (ZOFRAN) injection 4 mg  4 mg Intravenous Q6H PRN    polyethylene glycol (MIRALAX) packet 17 g  17 g Oral Daily    spironolactone (ALDACTONE) tablet 50 mg  50 mg Oral Daily    thiamine tablet 100 mg  100 mg Oral Daily       VTE Pharmacologic Prophylaxis: Enoxaparin (Lovenox)  VTE Mechanical Prophylaxis: sequential compression device    Portions of the record may have been created with voice recognition software. Occasional wrong word or "sound a like" substitutions may have occurred due to the inherent limitations of voice recognition software. Read the chart carefully and recognize, using context, where substitutions have occurred.   ==  Alexx Tse MD  [address] [facility]  Internal Medicine Residency PGY-1

## 2023-11-10 NOTE — TREATMENT PLAN
11/10/2023 5:42 PM -  Jude Blanton's chart and case were reviewed by Guille Larsen MD.  Mode of review included electronic chart check, and phone call to family. Per review, symptoms remain controlled on current regimen and no changes are made at this time. Please continue the regimen in place, and review our last note for details. For dispo plan, please review Case Management notes. An attempt was made to confer with the pt's default decision-maker, mother Sebastian Georges. She seemed confused by the nature and details of conversation, radha the use, location, and outcomes related to hospice. She also did not seem to understand the severity of the patient's hepatic encephalopathy prevents him entirely from making good medical decisoins at this time. She did appear concerned, but disorganized in her thinking. She did give permission and instruction not to overrule / override the patient, and that if he is refusing medical care, he should NOT be restrained nor forced to engage with said cares. She did give permission and instruction to include pt's brother Mary Lou Mclain, who lives in home and is part of family care unit. Calls to Mary Lou Mclain by multiple teammates today appear to have gone unanswered. Our teammate LM on  to call our office at roughly 10am today. Given these challenges with pt's behavior and mother's insight, we advised primary team and our hospice liaison teammates to engage ith pt's brother as first contact in the patient's care and decision-making. Primary team is in agreement with this approach. Palliative care will return on Monday. Patient is not appropriate for outpatient follow-up; he would be better served by hospice. For urgent issues or any questions/concerns, please notify on-call provider via [address] You may also call our answering service 24/7 at [phone].     Guille Larsen MD  Palliative and Supportive Care  Clinic/Answering Service: [phone]  You can find me on [employer]!

## 2023-11-10 NOTE — HOSPICE NOTE
Hospice referral received. Pt approved for [facility] (can be provided in residence or nursing facility) by Dr Swetha Perez. Weekend Liaison Deborah Ratliff to reach out to pt family. Liaison will continue to follow.

## 2023-11-10 NOTE — PHYSICAL THERAPY NOTE
PHYSICAL THERAPY NOTE          Patient Name: Deven Rondon  [id number]'V Date: 11/10/2023       11/10/23 1121   PT Last Visit   PT Visit Date 11/10/23   Note Type   Note Type Cancelled Session   Cancel Reasons Refusal     PT will continue to follow as able.     Stacey Matias, PT, DPT

## 2023-11-10 NOTE — PLAN OF CARE
Problem: Prexisting or High Potential for Compromised Skin Integrity  Goal: Skin integrity is maintained or improved  Description: INTERVENTIONS:  - Identify patients at risk for skin breakdown  - Assess and monitor skin integrity  - Assess and monitor nutrition and hydration status  - Monitor labs   - Assess for incontinence   - Turn and reposition patient  - Assist with mobility/ambulation  - Relieve pressure over bony prominences  - Avoid friction and shearing  - Provide appropriate hygiene as needed including keeping skin clean and dry  - Evaluate need for skin moisturizer/barrier cream  - Collaborate with interdisciplinary team   - Patient/family teaching  - Consider wound care consult   Outcome: Progressing     Problem: PAIN - ADULT  Goal: Verbalizes/displays adequate comfort level or baseline comfort level  Description: Interventions:  - Encourage patient to monitor pain and request assistance  - Assess pain using appropriate pain scale  - Administer analgesics based on type and severity of pain and evaluate response  - Implement non-pharmacological measures as appropriate and evaluate response  - Consider cultural and social influences on pain and pain management  - Notify physician/advanced practitioner if interventions unsuccessful or patient reports new pain  Outcome: Progressing     Problem: INFECTION - ADULT  Goal: Absence or prevention of progression during hospitalization  Description: INTERVENTIONS:  - Assess and monitor for signs and symptoms of infection  - Monitor lab/diagnostic results  - Monitor all insertion sites, i.e. indwelling lines, tubes, and drains  - Monitor endotracheal if appropriate and nasal secretions for changes in amount and color  - Delta appropriate cooling/warming therapies per order  - Administer medications as ordered  - Instruct and encourage patient and family to use good hand hygiene technique  - Identify and instruct in appropriate isolation precautions for identified infection/condition  Outcome: Progressing     Problem: INFECTION - ADULT  Goal: Absence of fever/infection during neutropenic period  Description: INTERVENTIONS:  - Monitor WBC    Outcome: Progressing

## 2023-11-10 NOTE — OCCUPATIONAL THERAPY NOTE
Occupational Therapy Cancel Note       11/10/23 1125   OT Last Visit   OT Visit Date 11/10/23   Note Type   Note Type Cancelled Session   Cancel Reasons Refusal         Nghia Blandon, OT

## 2023-11-11 PROCEDURE — 99232 SBSQ HOSP IP/OBS MODERATE 35: CPT | Performed by: INTERNAL MEDICINE

## 2023-11-11 RX ADMIN — POLYETHYLENE GLYCOL 3350 17 G: 17 POWDER, FOR SOLUTION ORAL at 08:27

## 2023-11-11 NOTE — PLAN OF CARE
Problem: Prexisting or High Potential for Compromised Skin Integrity  Goal: Skin integrity is maintained or improved  Description: INTERVENTIONS:  - Identify patients at risk for skin breakdown  - Assess and monitor skin integrity  - Assess and monitor nutrition and hydration status  - Monitor labs   - Assess for incontinence   - Turn and reposition patient  - Assist with mobility/ambulation  - Relieve pressure over bony prominences  - Avoid friction and shearing  - Provide appropriate hygiene as needed including keeping skin clean and dry  - Evaluate need for skin moisturizer/barrier cream  - Collaborate with interdisciplinary team   - Patient/family teaching  - Consider wound care consult   Outcome: Not Progressing     Problem: DISCHARGE PLANNING  Goal: Discharge to home or other facility with appropriate resources  Description: INTERVENTIONS:  - Identify barriers to discharge w/patient and caregiver  - Arrange for needed discharge resources and transportation as appropriate  - Identify discharge learning needs (meds, wound care, etc.)  - Arrange for interpretive services to assist at discharge as needed  - Refer to Case Management Department for coordinating discharge planning if the patient needs post-hospital services based on physician/advanced practitioner order or complex needs related to functional status, cognitive ability, or social support system  Outcome: Progressing

## 2023-11-11 NOTE — CASE MANAGEMENT
Case Management Discharge Planning Note    Patient name [facility]  Location [address] [facility] [place] 765/-75 MRN [id number]  : 1965 Date 2023       Current Admission Date: 2023  Current Admission Diagnosis:Decompensated hepatic cirrhosis [facility])   Patient Active Problem List    Diagnosis Date Noted    Anemia 2023    Class 2 obesity in adult 2023    Decompensated hepatic cirrhosis ([address]) 2023    Constipation 2023    Other ascites 2023      LOS (days): 9  Geometric Mean LOS (GMLOS) (days): 3.30  Days to GMLOS:-5.7     OBJECTIVE:  Risk of Unplanned Readmission Score: 8.41         Current admission status: Inpatient   Preferred Pharmacy:   [address] [place] [address]  No address on file      Primary Care Provider: No primary care provider on file.     Primary Insurance: Josefine Mcardle MA [employer]  Secondary Insurance:     DISCHARGE DETAILS:           Family notified[de-identified] CM spoke with pt mother re: trasport and advised he will picked up at 10am on

## 2023-11-11 NOTE — PROGRESS NOTES
INTERNAL MEDICINE RESIDENCY PROGRESS NOTE     Name: Murel Schlatter   Age & Sex: 66 y.o. male   MRN: [id number]  Unit/Bed#: -[facility]   Encounter: [id number]  Team: SOD Team C     PATIENT INFORMATION     Name: Murel Schlatter   Age & Sex: 66 y.o. male   MRN: [id number]  Hospital Stay Days: 9    ASSESSMENT/PLAN     Principal Problem:    Decompensated hepatic cirrhosis ([address])  Active Problems:    Constipation    Other ascites    Anemia      Anemia  Assessment & Plan  Patient found to be anemic on presentation - hemoglobin improved to 9.5 following paracentesis. Previous iron panels notable for low normal iron, markedly low transferrin, elevated ferritin. No overt signs of bleeding at this time. Plan  Continue to monitor, especially following further paracenteses      Other ascites  Assessment & Plan  See plan for "decompensated hepatic cirrhosis"    Constipation  Assessment & Plan  Patient endorsing constipation on admission, likely in setting of profound abdominal distention and ascites. He continues to endorse ongoing constipation even following paracentesis. Patient previously on lactulose. Of note, patient now with asterixis on exam this morning. Patient refusing lactulose since 11/5. Consider lactulose enema. Patient had 1 BM 11/8. Plan:  Palliative care consulted, future decision making by family. Considering home hospice. Lactulose therapy, can consider lactulose retention enema as well; has been refusing.   Titrate to 3-4 bowel movements daily  Miralax/Dulcolax otherwise      * Decompensated hepatic cirrhosis (HCC)  Assessment & Plan  MELD 3.0: 17 at 11/4/2023  3:57 AM  MELD-Na: 17 at 11/4/2023  3:57 AM  Calculated from:  Serum Creatinine: 0.70 mg/dL (Using min of 1 mg/dL) at 11/4/2023  3:57 AM  Serum Sodium: 134 mmol/L at 11/4/2023  3:57 AM  Total Bilirubin: 2.27 mg/dL at 11/4/2023  3:57 AM  Serum Albumin: 2.7 g/dL at 11/4/2023  3:57 AM  INR(ratio): 1.54 at 11/2/2023 10:02 AM  Age at listing (hypothetical): 66 years  Sex: Male at 11/4/2023  3:57 AM         Patient currently presenting with decompensated cirrhosis with marked ascites and encephalopathy. 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 However, patient's mother also revealed a significant history of alcohol abuse. Likely dual etiology. Patient has not had paracentesis since May 2023 and has not followed up with outpatient. S/p paracentesis on 11/3 with removal of 19 L. Partially repleted with albumin, although patient refused full dose. SAAG<1.1, fluid without bacterial growth as of 11/6. S/p paracentesis on 11/6 with removal of 16.45L. Not repleted with albumin, but BP has been stable. Continue to monitor vitals with concern for infection. Patient continues to present with encephalopathy. Not mentating at baseline. Asterixis on exam. Reduced communication and responsiveness during examination. Patient has refused all meds since 11/5 and has not had a bowel movement in several days. Concern for worsening encephalopathy. May need to utilize lactulose enema if PO continues to be refused by patient. If patient transitions to home hospice consider ASEPT catheter to drain ascites. Palliative care consulted. Plan:  Disposition pending further discussion between family, primary team and palliative care; considering home hospice and code status change, discussion ongoing. Consider ASEPT catheter vs. repeated paracenteses once disposition finalized. Attempt to give patient aldactone/lactulose, thiamine/folate supplements. Consider lactulose enema  Monitor outputs  Sodium and fluid restricted diet, ensure protein supplements  6-8g/liter albumin replacement for paracentesis > 5L  Neuropsych eval obtained, patient lacks capacity at this time        Disposition: discussion regarding home hospice and changing code status still ongoing. SUBJECTIVE     Patient seen and examined. No acute events overnight. Patient continues to refuse labs, and medications. He has not amenable to answering ROS. States he is doing well with no complaints. OBJECTIVE     Vitals:    11/10/23 0749 11/10/23 2151 23 0727 23 07   BP: 117/68 115/69 111/70 111/70   Pulse: 91 86  79   Resp: 16 15 14    Temp: 98.3 °F (36.8 °C) 98 °F (36.7 °C) 97.9 °F (36.6 °C) 97.9 °F (36.6 °C)   TempSrc:       SpO2: (!) 89% 98%  91%   Weight:          Temperature:   Temp (24hrs), Av.9 °F (36.6 °C), Min:97.9 °F (36.6 °C), Max:98 °F (36.7 °C)    Temperature: 97.9 °F (36.6 °C)  Intake & Output:  I/O          0701  11/10 0700 11/10 07 07 07 07    P. O. 150  120    Total Intake(mL/kg) 150 (1.2)  120 (1)    Urine (mL/kg/hr)  150 (0.1)     Total Output  150     Net +150 -150 +120           Unmeasured Urine Occurrence 3 x            Weights: There is no height or weight on file to calculate BMI. Weight (last 2 days)       None          Physical Exam  Constitutional:       General: He is not in acute distress. Appearance: Normal appearance. He is normal weight. He is not ill-appearing or toxic-appearing. HENT:      Head: Normocephalic and atraumatic. Pulmonary:      Effort: Pulmonary effort is normal. No respiratory distress. Abdominal:      General: There is distension. Neurological:      Mental Status: He is alert. Psychiatric:      Comments: Patient not appropriately responding to questioning and ROS        LABORATORY DATA     Labs: I have personally reviewed pertinent reports.   Results from last 7 days   Lab Units 23  0145 23  0525   WBC Thousand/uL 7.58 7.51   HEMOGLOBIN g/dL 9.2* 9.5*   HEMATOCRIT % 29.8* 30.5*   PLATELETS Thousands/uL 157 171   MONO PCT %  --  12   EOS PCT %  --  4      Results from last 7 days   Lab Units 23  0145 23  0525   POTASSIUM mmol/L 4.0 3.8   CHLORIDE mmol/L 101 99 CO2 mmol/L 34* 33*   BUN mg/dL 8 8   CREATININE mg/dL 0.65 0.67   CALCIUM mg/dL 7.9* 8.1*   ALK PHOS U/L  --  82   ALT U/L  --  4*   AST U/L  --  33     Results from last 7 days   Lab Units 11/05/23  0525   MAGNESIUM mg/dL 1.9     Results from last 7 days   Lab Units 11/05/23  0525   PHOSPHORUS mg/dL 3.2                    IMAGING & DIAGNOSTIC TESTING     Radiology Results: I have personally reviewed pertinent reports. IR INPATIENT Paracentesis    Result Date: 11/9/2023  Impression: Impression: Ultrasound-guided left paracentesis. Signed, performed, and dictated by FATUMA Hu under the direct supervision of Dr. Michelle Bernardo. Workstation performed: [id number]     US abdomen limited    Result Date: 11/2/2023  Impression: Ascites visible in all 4 quadrants. Workstation performed: [id number]     Other Diagnostic Testing: I have personally reviewed pertinent reports. ACTIVE MEDICATIONS     Current Facility-Administered Medications   Medication Dose Route Frequency    bisacodyl (DULCOLAX) rectal suppository 10 mg  10 mg Rectal Daily PRN    enoxaparin (LOVENOX) subcutaneous injection 40 mg  40 mg Subcutaneous Daily    folic acid (FOLVITE) tablet 1 mg  1 mg Oral Daily    lactulose oral solution 20 g  20 g Oral Daily    melatonin tablet 3 mg  3 mg Oral HS    ondansetron (ZOFRAN) injection 4 mg  4 mg Intravenous Q6H PRN    polyethylene glycol (MIRALAX) packet 17 g  17 g Oral Daily    spironolactone (ALDACTONE) tablet 50 mg  50 mg Oral Daily    thiamine tablet 100 mg  100 mg Oral Daily       VTE Pharmacologic Prophylaxis: Enoxaparin (Lovenox)  VTE Mechanical Prophylaxis: sequential compression device    Portions of the record may have been created with voice recognition software. Occasional wrong word or "sound a like" substitutions may have occurred due to the inherent limitations of voice recognition software.   Read the chart carefully and recognize, using context, where substitutions have occurred.  ==  Ceci Griffith [address] [facility]  Internal Medicine Residency PGY-2

## 2023-11-12 ENCOUNTER — HOME CARE VISIT (OUTPATIENT)
Dept: HOME HOSPICE | Facility: HOSPICE | Age: 58
End: 2023-11-12
Payer: MEDICARE

## 2023-11-12 ENCOUNTER — HOME CARE VISIT (OUTPATIENT)
Dept: HOME HEALTH SERVICES | Facility: HOME HEALTHCARE | Age: 58
End: 2023-11-12
Payer: MEDICARE

## 2023-11-12 VITALS
OXYGEN SATURATION: 97 % | DIASTOLIC BLOOD PRESSURE: 63 MMHG | HEART RATE: 89 BPM | WEIGHT: 276.4 LBS | RESPIRATION RATE: 18 BRPM | SYSTOLIC BLOOD PRESSURE: 94 MMHG | TEMPERATURE: 98 F

## 2023-11-12 VITALS
SYSTOLIC BLOOD PRESSURE: 110 MMHG | HEIGHT: 69 IN | RESPIRATION RATE: 22 BRPM | BODY MASS INDEX: 40.82 KG/M2 | DIASTOLIC BLOOD PRESSURE: 68 MMHG | HEART RATE: 64 BPM

## 2023-11-12 PROCEDURE — T2042 HOSPICE ROUTINE HOME CARE: HCPCS

## 2023-11-12 PROCEDURE — 99238 HOSP IP/OBS DSCHRG MGMT 30/<: CPT | Performed by: INTERNAL MEDICINE

## 2023-11-12 PROCEDURE — G0299 HHS/HOSPICE OF RN EA 15 MIN: HCPCS

## 2023-11-12 RX ORDER — BISACODYL 10 MG
10 SUPPOSITORY, RECTAL RECTAL DAILY PRN
Qty: 12 SUPPOSITORY | Refills: 0
Start: 2023-11-12

## 2023-11-12 RX ORDER — LANOLIN ALCOHOL/MO/W.PET/CERES
3 CREAM (GRAM) TOPICAL
Qty: 30 TABLET | Refills: 0
Start: 2023-11-12 | End: 2023-11-13

## 2023-11-12 NOTE — DISCHARGE SUMMARY
INTERNAL MEDICINE RESIDENCY DISCHARGE SUMMARY     Isaiah Perdue   66 y.o. male  MRN: [id number]  Room/Bed: /MS [address] [place] [address]    Encounter: [id number]    Principal Problem:    Decompensated hepatic cirrhosis [facility])  Active Problems:    Constipation    Other ascites    Anemia      Anemia  Assessment & Plan  Patient found to be anemic on presentation - hemoglobin improved to 9.5 following paracentesis. Previous iron panels notable for low normal iron, markedly low transferrin, elevated ferritin. No overt signs of bleeding at this time. Plan  Continue to monitor, especially following further paracenteses      Other ascites  Assessment & Plan  See plan for "decompensated hepatic cirrhosis"    Constipation  Assessment & Plan  Patient endorsing constipation on admission, likely in setting of profound abdominal distention and ascites. He continues to endorse ongoing constipation even following paracentesis. Patient previously on lactulose. Of note, patient now with asterixis on exam this morning. Patient refusing lactulose since 11/5. Consider lactulose enema. Patient had 1 BM 11/8. Plan:  Palliative care consulted, future decision making by family. Planned for home hospice with discharge 11/12/23 at 10 AM.   Lactulose therapy, can consider lactulose retention enema as well; has been refusing.   Titrate to 3-4 bowel movements daily  Miralax/Dulcolax otherwise      * Decompensated hepatic cirrhosis (HCC)  Assessment & Plan  MELD 3.0: 17 at 11/4/2023  3:57 AM  MELD-Na: 17 at 11/4/2023  3:57 AM  Calculated from:  Serum Creatinine: 0.70 mg/dL (Using min of 1 mg/dL) at 11/4/2023  3:57 AM  Serum Sodium: 134 mmol/L at 11/4/2023  3:57 AM  Total Bilirubin: 2.27 mg/dL at 11/4/2023  3:57 AM  Serum Albumin: 2.7 g/dL at 11/4/2023  3:57 AM  INR(ratio): 1.54 at 11/2/2023 10:02 AM  Age at listing (hypothetical): 58 years  Sex: Male at 11/4/2023  3:57 AM         Patient currently presenting with decompensated cirrhosis with marked ascites and encephalopathy. 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 However, patient's mother also revealed a significant history of alcohol abuse. Likely dual etiology. Patient has not had paracentesis since May 2023 and has not followed up with outpatient. S/p paracentesis on 11/3 with removal of 19 L. Partially repleted with albumin, although patient refused full dose. SAAG<1.1, fluid without bacterial growth as of 11/6. S/p paracentesis on 11/6 with removal of 16.45L. Not repleted with albumin, but BP has been stable. Continue to monitor vitals with concern for infection. Patient continues to present with encephalopathy. Not mentating at baseline. Asterixis on exam. Reduced communication and responsiveness during examination. Patient has refused all meds since 11/5 and has not had a bowel movement in several days. Concern for worsening encephalopathy. May need to utilize lactulose enema if PO continues to be refused by patient. If patient transitions to home hospice consider ASEPT catheter to drain ascites. Palliative care consulted. Plan:  Disposition pending further discussion between family, primary team and palliative care; currently decided on home hospice, discussion regarding code status ongoing. Consider ASEPT catheter vs. repeated paracenteses once disposition finalized. Attempt to give patient aldactone/lactulose, thiamine/folate supplements. Consider lactulose enema  Monitor outputs  Sodium and fluid restricted diet, ensure protein supplements  6-8g/liter albumin replacement for paracentesis > 5L  Neuropsych eval obtained, patient lacks capacity at this time        Subjective     Patient seen and evaluated. Remains confused but otherwise offers no acute complaints.   Tentatively scheduled to be discharged home with transport at 10 AM.    Objective     Vitals:    11/12/23 0803   BP: 94/63   Pulse:    Resp:    Temp: 98 °F (36.7 °C)   SpO2: 97%       Physical Exam  Constitutional:       General: He is not in acute distress. HENT:      Head: Normocephalic and atraumatic. Right Ear: External ear normal.      Left Ear: External ear normal.      Nose: Nose normal.      Mouth/Throat:      Mouth: Mucous membranes are dry. Pharynx: Oropharynx is clear. Eyes:      General: Scleral icterus present. Pupils: Pupils are equal, round, and reactive to light. Cardiovascular:      Rate and Rhythm: Normal rate and regular rhythm. Pulses: Normal pulses. Heart sounds: Normal heart sounds. Abdominal:      General: There is distension. Musculoskeletal:      Cervical back: Normal range of motion and neck supple. Right lower leg: No edema. Left lower leg: No edema. Skin:     General: Skin is warm and dry. Coloration: Skin is jaundiced. Neurological:      Mental Status: He is alert. He is disoriented. [facility]     H&P per Dr. Radha Peters, Mr. Cassandra Velarde is a 66 y.o. male with a past medical history of decompensated cirrhosis 2/2 autoimmune hepatitis who [facility] complaining of abdominal pain, distention, and constipation. Patient was previously hospitalized at [facility] in April-May 2023 for elevated bilirubin, hyponatremia, coagulopathy, hypoxia, and hypercapnia. During hospitalization patient underwent multiple paracenteses and thoracenteses. There was multiple incidents where patient was refusing procedures and he was found to not have capacity to make medical decisions per psychiatry. On discharge, patient was recommended to see hepatology, and prescribed multiple medications upon discharge. Patient states he has not taken any of the prescribed medications and still takes no medications on a daily basis.  He did not have any outpatient follow-ups since due to concerns with transportation. In the past, patients lab work up was significant for type 1 autoimmune hepatitis with elevated IgG, JOSE E, and anti-smooth muscle antibody. Patient has not had paracentesis since May 2023 and has not followed up with outpatient. Today, patient presented to the ED with severe abdominal distention, constipation, and abdominal pain. Patient states his abdominal swelling has become so severe, he is unable to get around. Additionally, he is unable to lay on his back without significant respiratory distress for CT imaging. On arrival in ED, patient was afebrile. Labs were significant for normal lipase, normal WBC, Hgb 10.3, Na 134, Ca 8.2, ALT 5, Alb 2.3, T. Bili 2.37, INR 1.54, PTT 45, direct bili 1.02. RUQ U/S showed ascites visible in all 4 quadrants. Patient was seen and examined at bedside. He was laying on his right side due to inability to lay flat. Patient states that he was very frustrated with his previous hospitalization and would like to know why he has so much fluid in his abdomen. His largest concern at this point is constipation 2/2 fluid build up and was his main reason for presentation. He denies chest pain, SOB, leg swelling. Patient informed of plan for IR paracentesis. Admitted to [facility] for further evaluation and management. Full Code."    Following admission, patient had a large-volume paracentesis totaling approximately 19 L. Patient was initially agreeable with albumin following this, but ultimately refused and only agreed to a 500 cc fluid bolus when he was found to be hypotensive after the paracentesis. Over the next several days, patient continued to refuse treatment including lactulose. He underwent a second large-volume paracentesis on 11/6 totaling approximately 16.5 L, after which he did not receive any albumin but was overall hemodynamically stable.     In the days following his second paracentesis, patient became increasingly confused and encephalopathic, refusing medications, lab work and vital signs. He was evaluated by neuropsychiatry and deemed to not have capacity to make medical decisions. Consulted palliative care and held family meeting with his brother Jose Ruiz and mother Markie Mcguire; spoke at length regarding patient's current condition, prognosis and repeated refusal of treatments. We explained that he does not currently have capacity to make decisions, however we would like to respect his wishes and those of his family regarding any further treatment. Discussed that were he to pursue treatment, it would include daily lactulose, likely intensive treatment with PT, possibly weekly paracenteses and consistent follow-up with PCP/GI. Additionally discussed that he would likely refuse and possibly resist any further attempts at giving lactulose, and family agreed that the use of restraints would not be in line with his wishes or theirs. Broached the possibility of hospice in facility versus possibly home hospice; his family agreed that he would want to go home. Patient's abdomen became slightly more distended over the next several days but otherwise remained hemodynamically stable, refusing all treatments, lab work and occasionally uncooperative with nursing staff. Decision ultimately made to pursue home hospice. Arrangements were made for transport home on day of discharge. DISCHARGE INFORMATION     PCP at Discharge:  none    Admitting Provider: No admitting provider for patient encounter.   Admission Date: 11/2/2023    Discharge Provider: No att. providers found  Discharge Date: 11/12/23    Discharge Disposition: Home/Self Care  Discharge Condition: stable  Discharge with Lines: no    Discharge Diet: regular diet  Activity Restrictions: none  Test Results Pending at Discharge: none    Discharge Diagnoses:  Principal Problem:    Decompensated hepatic cirrhosis ([address])  Active Problems:    Constipation    Other ascites    Anemia  Resolved Problems:    * No resolved hospital problems. *      Consulting Providers:      Diagnostic & Therapeutic Procedures Performed:  IR INPATIENT Paracentesis    Result Date: 11/9/2023  Impression: Impression: Ultrasound-guided left paracentesis. Signed, performed, and dictated by FATUMA Avalos under the direct supervision of Dr. Malik Ta. Workstation performed: [id number]     US abdomen limited    Result Date: 11/2/2023  Impression: Ascites visible in all 4 quadrants. Workstation performed: [id number]       Code Status: Level 1 - Full Code  Advance Directive & Living Will: <no information>  Power of :    POLST:      Medications:  Current Discharge Medication List        Current Discharge Medication List        START taking these medications    Details   bisacodyl (DULCOLAX) 10 mg suppository Insert 1 suppository (10 mg total) into the rectum daily as needed for constipation  Qty: 12 suppository, Refills: 0    Associated Diagnoses: Ascites of liver; Constipation, unspecified constipation type      melatonin 3 mg Take 1 tablet (3 mg total) by mouth daily at bedtime  Qty: 30 tablet, Refills: 0    Associated Diagnoses: Ascites of liver; Constipation, unspecified constipation type           Current Discharge Medication List          Allergies:  No Known Allergies    FOLLOW-UP     PCP Outpatient Follow-up:  none required    Consulting Providers Follow-up:  none required     Active Issues Requiring Follow-up:   none    Discharge Statement:   I spent 30 minutes minutes discharging the patient. This time was spent on the day of discharge. I had direct contact with the patient on the day of discharge. Additional documentation is required if more than 30 minutes were spent on discharge. Portions of the record may have been created with voice recognition software. Occasional wrong word or "sound a like" substitutions may have occurred due to the inherent limitations of voice recognition software.   Read the chart carefully and recognize, using context, where substitutions have occurred.     ==  Duane Sarabia  Internal Medicine Resident PGY-1

## 2023-11-12 NOTE — PLAN OF CARE
Problem: INFECTION - ADULT  Goal: Absence or prevention of progression during hospitalization  Description: INTERVENTIONS:  - Assess and monitor for signs and symptoms of infection  - Monitor lab/diagnostic results  - Monitor all insertion sites, i.e. indwelling lines, tubes, and drains  - Monitor endotracheal if appropriate and nasal secretions for changes in amount and color  - [place] appropriate cooling/warming therapies per order  - Administer medications as ordered  - Instruct and encourage patient and family to use good hand hygiene technique  - Identify and instruct in appropriate isolation precautions for identified infection/condition  Outcome: Progressing

## 2023-11-13 ENCOUNTER — HOME CARE VISIT (OUTPATIENT)
Dept: HOME HOSPICE | Facility: HOSPICE | Age: 58
End: 2023-11-13
Payer: MEDICARE

## 2023-11-13 PROCEDURE — 10330057 MEDICATION, GENERAL

## 2023-11-13 PROCEDURE — T2042 HOSPICE ROUTINE HOME CARE: HCPCS

## 2023-11-13 NOTE — CASE COMMUNICATION
Mother Called for lift assist to help get patient from current bed to the hospital bed that just arrived. I spoke to dispatcher #[id number] regarding needing a lift to get patient from current bed to hospital bed. Address provided and patient weights approximately 300 lbs per mother.  Dispatcher is sending [employer] out for Lift assist.

## 2023-11-13 NOTE — CASE COMMUNICATION
Ship to x Pt.  Home   [email or web address]     Tab Briefs    XL [id number]                      60/cs x1  Bedpan [id number] x1  Urinal [id number] x1  Underpad, reusable [id number]  [id number] x3

## 2023-11-14 ENCOUNTER — HOME CARE VISIT (OUTPATIENT)
Dept: HOME HOSPICE | Facility: HOSPICE | Age: 58
End: 2023-11-14
Payer: MEDICARE

## 2023-11-14 PROCEDURE — G0155 HHCP-SVS OF CSW,EA 15 MIN: HCPCS

## 2023-11-14 PROCEDURE — T2042 HOSPICE ROUTINE HOME CARE: HCPCS

## 2023-11-15 PROCEDURE — T2042 HOSPICE ROUTINE HOME CARE: HCPCS

## 2023-11-16 ENCOUNTER — HOME CARE VISIT (OUTPATIENT)
Dept: HOME HEALTH SERVICES | Facility: HOME HEALTHCARE | Age: 58
End: 2023-11-16
Payer: MEDICARE

## 2023-11-16 ENCOUNTER — HOME CARE VISIT (OUTPATIENT)
Dept: HOME HOSPICE | Facility: HOSPICE | Age: 58
End: 2023-11-16
Payer: MEDICARE

## 2023-11-16 VITALS
RESPIRATION RATE: 18 BRPM | HEART RATE: 82 BPM | DIASTOLIC BLOOD PRESSURE: 65 MMHG | TEMPERATURE: 97.9 F | SYSTOLIC BLOOD PRESSURE: 101 MMHG

## 2023-11-16 PROCEDURE — G0299 HHS/HOSPICE OF RN EA 15 MIN: HCPCS

## 2023-11-16 PROCEDURE — T2042 HOSPICE ROUTINE HOME CARE: HCPCS

## 2023-11-17 PROCEDURE — T2042 HOSPICE ROUTINE HOME CARE: HCPCS

## 2023-11-18 PROCEDURE — T2042 HOSPICE ROUTINE HOME CARE: HCPCS

## 2023-11-19 PROCEDURE — T2042 HOSPICE ROUTINE HOME CARE: HCPCS

## 2023-11-20 ENCOUNTER — HOME CARE VISIT (OUTPATIENT)
Dept: HOME HOSPICE | Facility: HOSPICE | Age: 58
End: 2023-11-20
Payer: MEDICARE

## 2023-11-20 PROCEDURE — T2042 HOSPICE ROUTINE HOME CARE: HCPCS

## 2023-11-21 PROCEDURE — T2042 HOSPICE ROUTINE HOME CARE: HCPCS

## 2023-11-22 ENCOUNTER — HOME CARE VISIT (OUTPATIENT)
Dept: HOME HEALTH SERVICES | Facility: HOME HEALTHCARE | Age: 58
End: 2023-11-22
Payer: MEDICARE

## 2023-11-22 VITALS
DIASTOLIC BLOOD PRESSURE: 70 MMHG | SYSTOLIC BLOOD PRESSURE: 118 MMHG | RESPIRATION RATE: 18 BRPM | TEMPERATURE: 98.7 F | HEART RATE: 80 BPM

## 2023-11-22 PROCEDURE — G0299 HHS/HOSPICE OF RN EA 15 MIN: HCPCS

## 2023-11-22 PROCEDURE — T2042 HOSPICE ROUTINE HOME CARE: HCPCS

## 2023-11-23 PROCEDURE — T2042 HOSPICE ROUTINE HOME CARE: HCPCS

## 2023-11-24 ENCOUNTER — TELEPHONE (OUTPATIENT)
Dept: GASTROENTEROLOGY | Facility: CLINIC | Age: 58
End: 2023-11-24

## 2023-11-24 PROCEDURE — T2042 HOSPICE ROUTINE HOME CARE: HCPCS

## 2023-11-24 NOTE — TELEPHONE ENCOUNTER
Called pt regarding his missed appnt on 11/24., LVM to call so that we can assist pt with rescheduling his missed appnt. Also, lvm for pt's mother, Juarez Walters. Sent letter.

## 2023-11-25 PROCEDURE — T2042 HOSPICE ROUTINE HOME CARE: HCPCS

## 2023-11-26 PROCEDURE — T2042 HOSPICE ROUTINE HOME CARE: HCPCS

## 2023-11-27 ENCOUNTER — HOME CARE VISIT (OUTPATIENT)
Dept: HOME HOSPICE | Facility: HOSPICE | Age: 58
End: 2023-11-27
Payer: MEDICARE

## 2023-11-27 PROCEDURE — T2042 HOSPICE ROUTINE HOME CARE: HCPCS

## 2023-11-28 ENCOUNTER — HOME CARE VISIT (OUTPATIENT)
Dept: HOME HEALTH SERVICES | Facility: HOME HEALTHCARE | Age: 58
End: 2023-11-28
Payer: MEDICARE

## 2023-11-28 PROCEDURE — T2042 HOSPICE ROUTINE HOME CARE: HCPCS

## 2023-11-28 PROCEDURE — G0299 HHS/HOSPICE OF RN EA 15 MIN: HCPCS

## 2023-11-29 PROCEDURE — T2042 HOSPICE ROUTINE HOME CARE: HCPCS

## 2023-11-30 PROCEDURE — T2042 HOSPICE ROUTINE HOME CARE: HCPCS

## 2023-12-01 ENCOUNTER — HOME CARE VISIT (OUTPATIENT)
Dept: HOME HEALTH SERVICES | Facility: HOME HEALTHCARE | Age: 58
End: 2023-12-01
Payer: MEDICARE

## 2023-12-01 PROCEDURE — T2042 HOSPICE ROUTINE HOME CARE: HCPCS

## 2023-12-01 PROCEDURE — G0300 HHS/HOSPICE OF LPN EA 15 MIN: HCPCS

## 2023-12-02 PROCEDURE — T2042 HOSPICE ROUTINE HOME CARE: HCPCS

## 2023-12-03 PROCEDURE — T2042 HOSPICE ROUTINE HOME CARE: HCPCS

## 2023-12-04 PROCEDURE — T2042 HOSPICE ROUTINE HOME CARE: HCPCS

## 2023-12-05 ENCOUNTER — HOME CARE VISIT (OUTPATIENT)
Dept: HOME HEALTH SERVICES | Facility: HOME HEALTHCARE | Age: 58
End: 2023-12-05
Payer: MEDICARE

## 2023-12-05 ENCOUNTER — HOME CARE VISIT (OUTPATIENT)
Dept: HOME HOSPICE | Facility: HOSPICE | Age: 58
End: 2023-12-05
Payer: MEDICARE

## 2023-12-05 VITALS
HEART RATE: 100 BPM | SYSTOLIC BLOOD PRESSURE: 118 MMHG | RESPIRATION RATE: 18 BRPM | TEMPERATURE: 97.7 F | DIASTOLIC BLOOD PRESSURE: 80 MMHG

## 2023-12-05 PROCEDURE — T2042 HOSPICE ROUTINE HOME CARE: HCPCS

## 2023-12-05 PROCEDURE — G0299 HHS/HOSPICE OF RN EA 15 MIN: HCPCS

## 2023-12-06 ENCOUNTER — HOME CARE VISIT (OUTPATIENT)
Dept: HOME HOSPICE | Facility: HOSPICE | Age: 58
End: 2023-12-06
Payer: MEDICARE

## 2023-12-06 PROCEDURE — T2042 HOSPICE ROUTINE HOME CARE: HCPCS

## 2023-12-06 NOTE — CASE COMMUNICATION
Mother called in as patient received the Trapeze to help pull himself up in bed. Mother informed it broke and hit pt left elbow area. Mother unaware of any pain or bruising or open wound . But informed she wants a nurse to check on patient's left elbow today and not wait for visit for [place]. She will show the trapeze to the nurse that comes out today.

## 2023-12-07 ENCOUNTER — HOME CARE VISIT (OUTPATIENT)
Dept: HOME HOSPICE | Facility: HOSPICE | Age: 58
End: 2023-12-07
Payer: MEDICARE

## 2023-12-07 PROCEDURE — T2042 HOSPICE ROUTINE HOME CARE: HCPCS

## 2023-12-08 ENCOUNTER — HOME CARE VISIT (OUTPATIENT)
Dept: HOME HOSPICE | Facility: HOSPICE | Age: 58
End: 2023-12-08
Payer: MEDICARE

## 2023-12-08 PROCEDURE — T2042 HOSPICE ROUTINE HOME CARE: HCPCS

## 2023-12-09 PROCEDURE — T2042 HOSPICE ROUTINE HOME CARE: HCPCS

## 2023-12-10 PROCEDURE — T2042 HOSPICE ROUTINE HOME CARE: HCPCS

## 2023-12-11 ENCOUNTER — HOME CARE VISIT (OUTPATIENT)
Dept: HOME HOSPICE | Facility: HOSPICE | Age: 58
End: 2023-12-11
Payer: MEDICARE

## 2023-12-11 ENCOUNTER — HOME CARE VISIT (OUTPATIENT)
Dept: HOME HEALTH SERVICES | Facility: HOME HEALTHCARE | Age: 58
End: 2023-12-11
Payer: MEDICARE

## 2023-12-13 ENCOUNTER — HOME CARE VISIT (OUTPATIENT)
Dept: HOME HEALTH SERVICES | Facility: HOME HEALTHCARE | Age: 58
End: 2023-12-13
Payer: MEDICARE

## 2023-12-13 ENCOUNTER — HOME CARE VISIT (OUTPATIENT)
Dept: HOME HOSPICE | Facility: HOSPICE | Age: 58
End: 2023-12-13
Payer: MEDICARE

## 2023-12-14 ENCOUNTER — HOME CARE VISIT (OUTPATIENT)
Dept: HOME HOSPICE | Facility: HOSPICE | Age: 58
End: 2023-12-14
Payer: MEDICARE

## 2023-12-15 ENCOUNTER — HOME CARE VISIT (OUTPATIENT)
Dept: HOME HOSPICE | Facility: HOSPICE | Age: 58
End: 2023-12-15
Payer: MEDICARE

## 2023-12-15 ENCOUNTER — HOME CARE VISIT (OUTPATIENT)
Dept: HOME HEALTH SERVICES | Facility: HOME HEALTHCARE | Age: 58
End: 2023-12-15
Payer: MEDICARE

## 2023-12-15 PROCEDURE — G0299 HHS/HOSPICE OF RN EA 15 MIN: HCPCS

## 2023-12-18 ENCOUNTER — HOME CARE VISIT (OUTPATIENT)
Dept: HOME HOSPICE | Facility: HOSPICE | Age: 58
End: 2023-12-18
Payer: MEDICARE

## 2023-12-18 ENCOUNTER — HOME CARE VISIT (OUTPATIENT)
Dept: HOME HEALTH SERVICES | Facility: HOME HEALTHCARE | Age: 58
End: 2023-12-18
Payer: MEDICARE

## 2023-12-18 PROCEDURE — G0299 HHS/HOSPICE OF RN EA 15 MIN: HCPCS

## 2023-12-19 ENCOUNTER — HOME CARE VISIT (OUTPATIENT)
Dept: HOME HOSPICE | Facility: HOSPICE | Age: 58
End: 2023-12-19
Payer: MEDICARE

## 2023-12-19 VITALS — DIASTOLIC BLOOD PRESSURE: 70 MMHG | RESPIRATION RATE: 20 BRPM | HEART RATE: 96 BPM | SYSTOLIC BLOOD PRESSURE: 110 MMHG

## 2023-12-20 ENCOUNTER — HOME CARE VISIT (OUTPATIENT)
Dept: HOME HEALTH SERVICES | Facility: HOME HEALTHCARE | Age: 58
End: 2023-12-20
Payer: MEDICARE

## 2023-12-21 ENCOUNTER — HOME CARE VISIT (OUTPATIENT)
Dept: HOME HEALTH SERVICES | Facility: HOME HEALTHCARE | Age: 58
End: 2023-12-21
Payer: MEDICARE

## 2023-12-21 VITALS
DIASTOLIC BLOOD PRESSURE: 75 MMHG | RESPIRATION RATE: 18 BRPM | SYSTOLIC BLOOD PRESSURE: 115 MMHG | HEART RATE: 80 BPM | TEMPERATURE: 97.8 F

## 2023-12-21 PROCEDURE — G0299 HHS/HOSPICE OF RN EA 15 MIN: HCPCS

## 2023-12-27 ENCOUNTER — HOME CARE VISIT (OUTPATIENT)
Dept: HOME HEALTH SERVICES | Facility: HOME HEALTHCARE | Age: 58
End: 2023-12-27
Payer: MEDICARE

## 2023-12-27 ENCOUNTER — HOME CARE VISIT (OUTPATIENT)
Dept: HOME HOSPICE | Facility: HOSPICE | Age: 58
End: 2023-12-27
Payer: MEDICARE

## 2023-12-29 ENCOUNTER — HOME CARE VISIT (OUTPATIENT)
Dept: HOME HEALTH SERVICES | Facility: HOME HEALTHCARE | Age: 58
End: 2023-12-29
Payer: MEDICARE

## 2023-12-29 ENCOUNTER — HOME CARE VISIT (OUTPATIENT)
Dept: HOME HOSPICE | Facility: HOSPICE | Age: 58
End: 2023-12-29
Payer: MEDICARE

## 2023-12-29 VITALS
HEART RATE: 98 BPM | DIASTOLIC BLOOD PRESSURE: 80 MMHG | TEMPERATURE: 97.7 F | SYSTOLIC BLOOD PRESSURE: 118 MMHG | RESPIRATION RATE: 20 BRPM

## 2023-12-29 PROCEDURE — G0299 HHS/HOSPICE OF RN EA 15 MIN: HCPCS

## 2024-01-03 ENCOUNTER — HOME CARE VISIT (OUTPATIENT)
Dept: HOME HEALTH SERVICES | Facility: HOME HEALTHCARE | Age: 59
End: 2024-01-03

## 2024-01-08 ENCOUNTER — TELEPHONE (OUTPATIENT)
Age: 59
End: 2024-01-08